# Patient Record
Sex: FEMALE | Race: WHITE | NOT HISPANIC OR LATINO | Employment: OTHER | URBAN - METROPOLITAN AREA
[De-identification: names, ages, dates, MRNs, and addresses within clinical notes are randomized per-mention and may not be internally consistent; named-entity substitution may affect disease eponyms.]

---

## 2022-03-20 ENCOUNTER — LAB REQUISITION (OUTPATIENT)
Dept: LAB | Facility: CLINIC | Age: 67
End: 2022-03-20
Payer: COMMERCIAL

## 2022-03-20 DIAGNOSIS — F10.20 ALCOHOL DEPENDENCE, UNCOMPLICATED (H): ICD-10-CM

## 2022-03-20 DIAGNOSIS — F31.9 BIPOLAR DISORDER, UNSPECIFIED (H): ICD-10-CM

## 2022-03-20 DIAGNOSIS — E87.1 HYPO-OSMOLALITY AND HYPONATREMIA: ICD-10-CM

## 2022-03-22 LAB
ALBUMIN SERPL-MCNC: 3.3 G/DL (ref 3.5–5)
ALP SERPL-CCNC: 52 U/L (ref 45–120)
ALT SERPL W P-5'-P-CCNC: 35 U/L (ref 0–45)
ANION GAP SERPL CALCULATED.3IONS-SCNC: 10 MMOL/L (ref 5–18)
AST SERPL W P-5'-P-CCNC: 27 U/L (ref 0–40)
BILIRUB SERPL-MCNC: 0.5 MG/DL (ref 0–1)
BUN SERPL-MCNC: 12 MG/DL (ref 8–22)
CALCIUM SERPL-MCNC: 9.4 MG/DL (ref 8.5–10.5)
CHLORIDE BLD-SCNC: 93 MMOL/L (ref 98–107)
CO2 SERPL-SCNC: 26 MMOL/L (ref 22–31)
CREAT SERPL-MCNC: 0.71 MG/DL (ref 0.6–1.1)
ERYTHROCYTE [DISTWIDTH] IN BLOOD BY AUTOMATED COUNT: 12.6 % (ref 10–15)
GFR SERPL CREATININE-BSD FRML MDRD: >90 ML/MIN/1.73M2
GLUCOSE BLD-MCNC: 83 MG/DL (ref 70–125)
HCT VFR BLD AUTO: 33.8 % (ref 35–47)
HGB BLD-MCNC: 11.4 G/DL (ref 11.7–15.7)
MCH RBC QN AUTO: 30.5 PG (ref 26.5–33)
MCHC RBC AUTO-ENTMCNC: 33.7 G/DL (ref 31.5–36.5)
MCV RBC AUTO: 90 FL (ref 78–100)
PLATELET # BLD AUTO: 172 10E3/UL (ref 150–450)
POTASSIUM BLD-SCNC: 4.7 MMOL/L (ref 3.5–5)
PROT SERPL-MCNC: 6.2 G/DL (ref 6–8)
RBC # BLD AUTO: 3.74 10E6/UL (ref 3.8–5.2)
SODIUM SERPL-SCNC: 129 MMOL/L (ref 136–145)
TSH SERPL DL<=0.005 MIU/L-ACNC: 1.16 UIU/ML (ref 0.3–5)
VIT B12 SERPL-MCNC: 442 PG/ML (ref 213–816)
WBC # BLD AUTO: 3.1 10E3/UL (ref 4–11)

## 2022-03-22 PROCEDURE — 82306 VITAMIN D 25 HYDROXY: CPT | Mod: ORL | Performed by: NURSE PRACTITIONER

## 2022-03-22 PROCEDURE — 36415 COLL VENOUS BLD VENIPUNCTURE: CPT | Mod: ORL | Performed by: NURSE PRACTITIONER

## 2022-03-22 PROCEDURE — 80053 COMPREHEN METABOLIC PANEL: CPT | Mod: ORL | Performed by: NURSE PRACTITIONER

## 2022-03-22 PROCEDURE — 82607 VITAMIN B-12: CPT | Mod: ORL | Performed by: NURSE PRACTITIONER

## 2022-03-22 PROCEDURE — 85027 COMPLETE CBC AUTOMATED: CPT | Mod: ORL | Performed by: NURSE PRACTITIONER

## 2022-03-22 PROCEDURE — P9603 ONE-WAY ALLOW PRORATED MILES: HCPCS | Mod: ORL | Performed by: NURSE PRACTITIONER

## 2022-03-22 PROCEDURE — 84443 ASSAY THYROID STIM HORMONE: CPT | Mod: ORL | Performed by: NURSE PRACTITIONER

## 2022-03-23 ENCOUNTER — LAB REQUISITION (OUTPATIENT)
Dept: LAB | Facility: CLINIC | Age: 67
End: 2022-03-23

## 2022-03-23 DIAGNOSIS — F10.20 ALCOHOL DEPENDENCE, UNCOMPLICATED (H): ICD-10-CM

## 2022-03-23 LAB — DEPRECATED CALCIDIOL+CALCIFEROL SERPL-MC: 36 UG/L (ref 30–80)

## 2022-06-05 ENCOUNTER — LAB REQUISITION (OUTPATIENT)
Dept: LAB | Facility: CLINIC | Age: 67
End: 2022-06-05
Payer: COMMERCIAL

## 2022-06-05 DIAGNOSIS — E87.1 HYPO-OSMOLALITY AND HYPONATREMIA: ICD-10-CM

## 2022-06-07 LAB
ANION GAP SERPL CALCULATED.3IONS-SCNC: 10 MMOL/L (ref 5–18)
BUN SERPL-MCNC: 10 MG/DL (ref 8–22)
CALCIUM SERPL-MCNC: 9.6 MG/DL (ref 8.5–10.5)
CHLORIDE BLD-SCNC: 91 MMOL/L (ref 98–107)
CO2 SERPL-SCNC: 26 MMOL/L (ref 22–31)
CREAT SERPL-MCNC: 0.63 MG/DL (ref 0.6–1.1)
GFR SERPL CREATININE-BSD FRML MDRD: >90 ML/MIN/1.73M2
GLUCOSE BLD-MCNC: 72 MG/DL (ref 70–125)
POTASSIUM BLD-SCNC: 3.9 MMOL/L (ref 3.5–5)
SODIUM SERPL-SCNC: 127 MMOL/L (ref 136–145)

## 2022-06-07 PROCEDURE — 36415 COLL VENOUS BLD VENIPUNCTURE: CPT | Mod: ORL | Performed by: NURSE PRACTITIONER

## 2022-06-07 PROCEDURE — P9603 ONE-WAY ALLOW PRORATED MILES: HCPCS | Mod: ORL | Performed by: NURSE PRACTITIONER

## 2022-06-07 PROCEDURE — 80048 BASIC METABOLIC PNL TOTAL CA: CPT | Mod: ORL | Performed by: NURSE PRACTITIONER

## 2022-06-08 ENCOUNTER — LAB REQUISITION (OUTPATIENT)
Dept: LAB | Facility: CLINIC | Age: 67
End: 2022-06-08
Payer: COMMERCIAL

## 2022-06-08 DIAGNOSIS — R35.0 FREQUENCY OF MICTURITION: ICD-10-CM

## 2022-06-08 LAB
ALBUMIN UR-MCNC: NEGATIVE MG/DL
APPEARANCE UR: CLEAR
BACTERIA #/AREA URNS HPF: ABNORMAL /HPF
BILIRUB UR QL STRIP: NEGATIVE
COLOR UR AUTO: ABNORMAL
GLUCOSE UR STRIP-MCNC: 100 MG/DL
HGB UR QL STRIP: NEGATIVE
KETONES UR STRIP-MCNC: NEGATIVE MG/DL
LEUKOCYTE ESTERASE UR QL STRIP: ABNORMAL
NITRATE UR QL: NEGATIVE
PH UR STRIP: 6.5 [PH] (ref 5–7)
RBC URINE: 0 /HPF
SP GR UR STRIP: 1.01 (ref 1–1.03)
SQUAMOUS EPITHELIAL: <1 /HPF
UROBILINOGEN UR STRIP-MCNC: <2 MG/DL
WBC URINE: 170 /HPF

## 2022-06-08 PROCEDURE — 87088 URINE BACTERIA CULTURE: CPT | Mod: ORL | Performed by: NURSE PRACTITIONER

## 2022-06-08 PROCEDURE — 81001 URINALYSIS AUTO W/SCOPE: CPT | Mod: ORL | Performed by: NURSE PRACTITIONER

## 2022-06-11 LAB — BACTERIA UR CULT: ABNORMAL

## 2022-06-18 ENCOUNTER — LAB REQUISITION (OUTPATIENT)
Dept: LAB | Facility: CLINIC | Age: 67
End: 2022-06-18
Payer: COMMERCIAL

## 2022-06-18 DIAGNOSIS — E87.1 HYPO-OSMOLALITY AND HYPONATREMIA: ICD-10-CM

## 2022-06-21 LAB — SODIUM SERPL-SCNC: 120 MMOL/L (ref 136–145)

## 2022-06-21 PROCEDURE — P9603 ONE-WAY ALLOW PRORATED MILES: HCPCS | Mod: ORL | Performed by: NURSE PRACTITIONER

## 2022-06-21 PROCEDURE — 84295 ASSAY OF SERUM SODIUM: CPT | Mod: ORL | Performed by: NURSE PRACTITIONER

## 2022-06-21 PROCEDURE — 36415 COLL VENOUS BLD VENIPUNCTURE: CPT | Mod: ORL | Performed by: NURSE PRACTITIONER

## 2022-06-24 ENCOUNTER — LAB REQUISITION (OUTPATIENT)
Dept: LAB | Facility: CLINIC | Age: 67
End: 2022-06-24
Payer: COMMERCIAL

## 2022-06-24 DIAGNOSIS — R30.0 DYSURIA: ICD-10-CM

## 2022-06-24 DIAGNOSIS — R35.0 FREQUENCY OF MICTURITION: ICD-10-CM

## 2022-06-24 LAB
ALBUMIN UR-MCNC: NEGATIVE MG/DL
APPEARANCE UR: CLEAR
BILIRUB UR QL STRIP: NEGATIVE
COLOR UR AUTO: ABNORMAL
GLUCOSE UR STRIP-MCNC: 70 MG/DL
HGB UR QL STRIP: NEGATIVE
KETONES UR STRIP-MCNC: NEGATIVE MG/DL
LEUKOCYTE ESTERASE UR QL STRIP: NEGATIVE
NITRATE UR QL: NEGATIVE
PH UR STRIP: 6 [PH] (ref 5–7)
RBC URINE: <1 /HPF
SP GR UR STRIP: 1.01 (ref 1–1.03)
UROBILINOGEN UR STRIP-MCNC: <2 MG/DL
WBC URINE: <1 /HPF

## 2022-06-24 PROCEDURE — 87086 URINE CULTURE/COLONY COUNT: CPT | Mod: ORL | Performed by: NURSE PRACTITIONER

## 2022-06-24 PROCEDURE — 81001 URINALYSIS AUTO W/SCOPE: CPT | Mod: ORL | Performed by: NURSE PRACTITIONER

## 2022-06-26 ENCOUNTER — LAB REQUISITION (OUTPATIENT)
Dept: LAB | Facility: CLINIC | Age: 67
End: 2022-06-26
Payer: COMMERCIAL

## 2022-06-26 DIAGNOSIS — E87.1 HYPO-OSMOLALITY AND HYPONATREMIA: ICD-10-CM

## 2022-06-26 LAB — BACTERIA UR CULT: NORMAL

## 2022-06-28 LAB — SODIUM SERPL-SCNC: 132 MMOL/L (ref 136–145)

## 2022-06-28 PROCEDURE — P9603 ONE-WAY ALLOW PRORATED MILES: HCPCS | Mod: ORL | Performed by: NURSE PRACTITIONER

## 2022-06-28 PROCEDURE — 84295 ASSAY OF SERUM SODIUM: CPT | Mod: ORL | Performed by: NURSE PRACTITIONER

## 2022-06-28 PROCEDURE — 36415 COLL VENOUS BLD VENIPUNCTURE: CPT | Mod: ORL | Performed by: NURSE PRACTITIONER

## 2022-07-01 ENCOUNTER — LAB REQUISITION (OUTPATIENT)
Dept: LAB | Facility: CLINIC | Age: 67
End: 2022-07-01
Payer: COMMERCIAL

## 2022-07-01 DIAGNOSIS — E87.1 HYPO-OSMOLALITY AND HYPONATREMIA: ICD-10-CM

## 2022-07-05 LAB
HBA1C MFR BLD: 5.5 %
SODIUM SERPL-SCNC: 131 MMOL/L (ref 136–145)

## 2022-07-05 PROCEDURE — 84295 ASSAY OF SERUM SODIUM: CPT | Mod: ORL | Performed by: NURSE PRACTITIONER

## 2022-07-05 PROCEDURE — P9603 ONE-WAY ALLOW PRORATED MILES: HCPCS | Mod: ORL | Performed by: NURSE PRACTITIONER

## 2022-07-05 PROCEDURE — 36415 COLL VENOUS BLD VENIPUNCTURE: CPT | Mod: ORL | Performed by: NURSE PRACTITIONER

## 2022-07-05 PROCEDURE — 83036 HEMOGLOBIN GLYCOSYLATED A1C: CPT | Mod: ORL | Performed by: NURSE PRACTITIONER

## 2022-07-22 ENCOUNTER — LAB REQUISITION (OUTPATIENT)
Dept: LAB | Facility: CLINIC | Age: 67
End: 2022-07-22
Payer: COMMERCIAL

## 2022-07-22 DIAGNOSIS — F31.9 BIPOLAR DISORDER, UNSPECIFIED (H): ICD-10-CM

## 2022-07-22 DIAGNOSIS — E87.1 HYPO-OSMOLALITY AND HYPONATREMIA: ICD-10-CM

## 2022-07-29 ENCOUNTER — LAB REQUISITION (OUTPATIENT)
Dept: LAB | Facility: CLINIC | Age: 67
End: 2022-07-29
Payer: COMMERCIAL

## 2022-07-29 DIAGNOSIS — F31.9 BIPOLAR DISORDER, UNSPECIFIED (H): ICD-10-CM

## 2022-07-29 DIAGNOSIS — E87.1 HYPO-OSMOLALITY AND HYPONATREMIA: ICD-10-CM

## 2022-08-02 LAB
ALBUMIN SERPL BCG-MCNC: 3.6 G/DL (ref 3.5–5.2)
ALP SERPL-CCNC: 52 U/L (ref 35–104)
ALT SERPL W P-5'-P-CCNC: 17 U/L (ref 10–35)
ANION GAP SERPL CALCULATED.3IONS-SCNC: 11 MMOL/L (ref 7–15)
AST SERPL W P-5'-P-CCNC: 23 U/L (ref 10–35)
BILIRUB SERPL-MCNC: 0.3 MG/DL
BUN SERPL-MCNC: 11.9 MG/DL (ref 8–23)
CALCIUM SERPL-MCNC: 9.3 MG/DL (ref 8.8–10.2)
CHLORIDE SERPL-SCNC: 103 MMOL/L (ref 98–107)
CREAT SERPL-MCNC: 0.68 MG/DL (ref 0.51–0.95)
DEPRECATED HCO3 PLAS-SCNC: 25 MMOL/L (ref 22–29)
GFR SERPL CREATININE-BSD FRML MDRD: >90 ML/MIN/1.73M2
GLUCOSE SERPL-MCNC: 70 MG/DL (ref 70–99)
POTASSIUM SERPL-SCNC: 3.9 MMOL/L (ref 3.4–5.3)
PROT SERPL-MCNC: 5.9 G/DL (ref 6.4–8.3)
SODIUM SERPL-SCNC: 139 MMOL/L (ref 136–145)

## 2022-08-02 PROCEDURE — 80053 COMPREHEN METABOLIC PANEL: CPT | Mod: ORL | Performed by: NURSE PRACTITIONER

## 2022-08-02 PROCEDURE — 36415 COLL VENOUS BLD VENIPUNCTURE: CPT | Mod: ORL | Performed by: NURSE PRACTITIONER

## 2022-08-02 PROCEDURE — P9603 ONE-WAY ALLOW PRORATED MILES: HCPCS | Mod: ORL | Performed by: NURSE PRACTITIONER

## 2022-08-05 ENCOUNTER — LAB REQUISITION (OUTPATIENT)
Dept: LAB | Facility: CLINIC | Age: 67
End: 2022-08-05
Payer: COMMERCIAL

## 2022-08-05 DIAGNOSIS — F20.9 SCHIZOPHRENIA, UNSPECIFIED (H): ICD-10-CM

## 2022-08-05 DIAGNOSIS — Z79.899 OTHER LONG TERM (CURRENT) DRUG THERAPY: ICD-10-CM

## 2022-08-09 LAB
AMYLASE SERPL-CCNC: 72 U/L (ref 28–100)
FASTING STATUS PATIENT QL REPORTED: ABNORMAL
GLUCOSE SERPL-MCNC: 66 MG/DL (ref 70–99)
HBA1C MFR BLD: 5.4 %
PROLACTIN SERPL 3RD IS-MCNC: 30 NG/ML (ref 5–23)
T4 FREE SERPL-MCNC: 1.2 NG/DL (ref 0.9–1.7)
TSH SERPL DL<=0.005 MIU/L-ACNC: 1.28 UIU/ML (ref 0.3–4.2)
VALPROATE SERPL-MCNC: 61.9 UG/ML

## 2022-08-09 PROCEDURE — P9603 ONE-WAY ALLOW PRORATED MILES: HCPCS | Mod: ORL | Performed by: PSYCHIATRY & NEUROLOGY

## 2022-08-09 PROCEDURE — 84439 ASSAY OF FREE THYROXINE: CPT | Mod: ORL | Performed by: PSYCHIATRY & NEUROLOGY

## 2022-08-09 PROCEDURE — 80164 ASSAY DIPROPYLACETIC ACD TOT: CPT | Mod: ORL | Performed by: PSYCHIATRY & NEUROLOGY

## 2022-08-09 PROCEDURE — 84146 ASSAY OF PROLACTIN: CPT | Mod: ORL | Performed by: PSYCHIATRY & NEUROLOGY

## 2022-08-09 PROCEDURE — 82150 ASSAY OF AMYLASE: CPT | Mod: ORL | Performed by: PSYCHIATRY & NEUROLOGY

## 2022-08-09 PROCEDURE — 36415 COLL VENOUS BLD VENIPUNCTURE: CPT | Mod: ORL | Performed by: PSYCHIATRY & NEUROLOGY

## 2022-08-09 PROCEDURE — 83036 HEMOGLOBIN GLYCOSYLATED A1C: CPT | Mod: ORL | Performed by: PSYCHIATRY & NEUROLOGY

## 2022-08-09 PROCEDURE — 82947 ASSAY GLUCOSE BLOOD QUANT: CPT | Mod: ORL | Performed by: PSYCHIATRY & NEUROLOGY

## 2022-08-09 PROCEDURE — 84443 ASSAY THYROID STIM HORMONE: CPT | Mod: ORL | Performed by: PSYCHIATRY & NEUROLOGY

## 2022-08-20 ENCOUNTER — LAB REQUISITION (OUTPATIENT)
Dept: LAB | Facility: CLINIC | Age: 67
End: 2022-08-20
Payer: COMMERCIAL

## 2022-08-20 DIAGNOSIS — E87.1 HYPO-OSMOLALITY AND HYPONATREMIA: ICD-10-CM

## 2022-08-23 LAB — SODIUM SERPL-SCNC: 127 MMOL/L (ref 136–145)

## 2022-08-23 PROCEDURE — 36415 COLL VENOUS BLD VENIPUNCTURE: CPT | Mod: ORL | Performed by: NURSE PRACTITIONER

## 2022-08-23 PROCEDURE — P9603 ONE-WAY ALLOW PRORATED MILES: HCPCS | Mod: ORL | Performed by: NURSE PRACTITIONER

## 2022-08-23 PROCEDURE — 84295 ASSAY OF SERUM SODIUM: CPT | Mod: ORL | Performed by: NURSE PRACTITIONER

## 2022-08-29 ENCOUNTER — LAB REQUISITION (OUTPATIENT)
Dept: LAB | Facility: CLINIC | Age: 67
End: 2022-08-29
Payer: COMMERCIAL

## 2022-08-29 LAB
ALBUMIN UR-MCNC: NEGATIVE MG/DL
APPEARANCE UR: CLEAR
BILIRUB UR QL STRIP: NEGATIVE
COLOR UR AUTO: YELLOW
GLUCOSE UR STRIP-MCNC: NEGATIVE MG/DL
HGB UR QL STRIP: NEGATIVE
KETONES UR STRIP-MCNC: NEGATIVE MG/DL
LEUKOCYTE ESTERASE UR QL STRIP: NEGATIVE
NITRATE UR QL: NEGATIVE
PH UR STRIP: 7 [PH] (ref 5–7)
RBC URINE: <1 /HPF
SP GR UR STRIP: 1.01 (ref 1–1.03)
UROBILINOGEN UR STRIP-MCNC: NORMAL MG/DL
WBC URINE: 3 /HPF

## 2022-08-29 PROCEDURE — 81001 URINALYSIS AUTO W/SCOPE: CPT | Mod: ORL

## 2022-08-29 PROCEDURE — 81001 URINALYSIS AUTO W/SCOPE: CPT | Mod: ORL | Performed by: NURSE PRACTITIONER

## 2022-09-09 ENCOUNTER — LAB REQUISITION (OUTPATIENT)
Dept: LAB | Facility: CLINIC | Age: 67
End: 2022-09-09
Payer: COMMERCIAL

## 2022-09-09 DIAGNOSIS — E87.1 HYPO-OSMOLALITY AND HYPONATREMIA: ICD-10-CM

## 2022-09-11 ENCOUNTER — LAB REQUISITION (OUTPATIENT)
Dept: LAB | Facility: CLINIC | Age: 67
End: 2022-09-11
Payer: COMMERCIAL

## 2022-09-11 DIAGNOSIS — E87.1 HYPO-OSMOLALITY AND HYPONATREMIA: ICD-10-CM

## 2022-09-12 LAB
ALBUMIN SERPL BCG-MCNC: 3.9 G/DL (ref 3.5–5.2)
ALP SERPL-CCNC: 57 U/L (ref 35–104)
ALT SERPL W P-5'-P-CCNC: 19 U/L (ref 10–35)
AMYLASE SERPL-CCNC: 98 U/L (ref 28–100)
AST SERPL W P-5'-P-CCNC: 25 U/L (ref 10–35)
BASOPHILS # BLD AUTO: 0 10E3/UL (ref 0–0.2)
BASOPHILS NFR BLD AUTO: 1 %
BILIRUB DIRECT SERPL-MCNC: <0.2 MG/DL (ref 0–0.3)
BILIRUB SERPL-MCNC: 0.4 MG/DL
CHOLEST SERPL-MCNC: 196 MG/DL
EOSINOPHIL # BLD AUTO: 0 10E3/UL (ref 0–0.7)
EOSINOPHIL NFR BLD AUTO: 1 %
ERYTHROCYTE [DISTWIDTH] IN BLOOD BY AUTOMATED COUNT: 13.3 % (ref 10–15)
FASTING STATUS PATIENT QL REPORTED: NORMAL
GLUCOSE SERPL-MCNC: 78 MG/DL (ref 70–99)
HBA1C MFR BLD: 5.4 %
HCT VFR BLD AUTO: 38 % (ref 35–47)
HDLC SERPL-MCNC: 74 MG/DL
HGB BLD-MCNC: 12.6 G/DL (ref 11.7–15.7)
IMM GRANULOCYTES # BLD: 0 10E3/UL
IMM GRANULOCYTES NFR BLD: 0 %
LDLC SERPL CALC-MCNC: 110 MG/DL
LYMPHOCYTES # BLD AUTO: 1.6 10E3/UL (ref 0.8–5.3)
LYMPHOCYTES NFR BLD AUTO: 48 %
MCH RBC QN AUTO: 30.4 PG (ref 26.5–33)
MCHC RBC AUTO-ENTMCNC: 33.2 G/DL (ref 31.5–36.5)
MCV RBC AUTO: 92 FL (ref 78–100)
MONOCYTES # BLD AUTO: 0.4 10E3/UL (ref 0–1.3)
MONOCYTES NFR BLD AUTO: 11 %
NEUTROPHILS # BLD AUTO: 1.3 10E3/UL (ref 1.6–8.3)
NEUTROPHILS NFR BLD AUTO: 39 %
NONHDLC SERPL-MCNC: 122 MG/DL
NRBC # BLD AUTO: 0 10E3/UL
NRBC BLD AUTO-RTO: 0 /100
PLATELET # BLD AUTO: 176 10E3/UL (ref 150–450)
PROT SERPL-MCNC: 6.6 G/DL (ref 6.4–8.3)
RBC # BLD AUTO: 4.15 10E6/UL (ref 3.8–5.2)
SODIUM SERPL-SCNC: 130 MMOL/L (ref 136–145)
TRIGL SERPL-MCNC: 60 MG/DL
VALPROATE SERPL-MCNC: 75.9 UG/ML
WBC # BLD AUTO: 3.4 10E3/UL (ref 4–11)

## 2022-09-12 PROCEDURE — 85025 COMPLETE CBC W/AUTO DIFF WBC: CPT | Mod: ORL | Performed by: PSYCHIATRY & NEUROLOGY

## 2022-09-12 PROCEDURE — 80061 LIPID PANEL: CPT | Mod: ORL | Performed by: PSYCHIATRY & NEUROLOGY

## 2022-09-12 PROCEDURE — 82947 ASSAY GLUCOSE BLOOD QUANT: CPT | Mod: ORL | Performed by: PSYCHIATRY & NEUROLOGY

## 2022-09-12 PROCEDURE — 36415 COLL VENOUS BLD VENIPUNCTURE: CPT | Mod: ORL | Performed by: PSYCHIATRY & NEUROLOGY

## 2022-09-12 PROCEDURE — 80164 ASSAY DIPROPYLACETIC ACD TOT: CPT | Mod: ORL | Performed by: PSYCHIATRY & NEUROLOGY

## 2022-09-12 PROCEDURE — P9603 ONE-WAY ALLOW PRORATED MILES: HCPCS | Mod: ORL | Performed by: PSYCHIATRY & NEUROLOGY

## 2022-09-12 PROCEDURE — 80076 HEPATIC FUNCTION PANEL: CPT | Mod: ORL | Performed by: PSYCHIATRY & NEUROLOGY

## 2022-09-12 PROCEDURE — 84295 ASSAY OF SERUM SODIUM: CPT | Mod: ORL | Performed by: PSYCHIATRY & NEUROLOGY

## 2022-09-12 PROCEDURE — 82150 ASSAY OF AMYLASE: CPT | Mod: ORL | Performed by: PSYCHIATRY & NEUROLOGY

## 2022-09-12 PROCEDURE — 83036 HEMOGLOBIN GLYCOSYLATED A1C: CPT | Mod: ORL | Performed by: PSYCHIATRY & NEUROLOGY

## 2022-09-21 ENCOUNTER — LAB REQUISITION (OUTPATIENT)
Dept: LAB | Facility: CLINIC | Age: 67
End: 2022-09-21
Payer: COMMERCIAL

## 2022-09-21 DIAGNOSIS — E87.1 HYPO-OSMOLALITY AND HYPONATREMIA: ICD-10-CM

## 2022-09-26 LAB — SODIUM SERPL-SCNC: 130 MMOL/L (ref 136–145)

## 2022-09-26 PROCEDURE — P9603 ONE-WAY ALLOW PRORATED MILES: HCPCS | Mod: ORL | Performed by: NURSE PRACTITIONER

## 2022-09-26 PROCEDURE — 84295 ASSAY OF SERUM SODIUM: CPT | Mod: ORL | Performed by: NURSE PRACTITIONER

## 2022-09-26 PROCEDURE — 36415 COLL VENOUS BLD VENIPUNCTURE: CPT | Mod: ORL | Performed by: NURSE PRACTITIONER

## 2022-10-04 ENCOUNTER — LAB REQUISITION (OUTPATIENT)
Dept: LAB | Facility: CLINIC | Age: 67
End: 2022-10-04
Payer: COMMERCIAL

## 2022-10-04 DIAGNOSIS — E87.1 HYPO-OSMOLALITY AND HYPONATREMIA: ICD-10-CM

## 2022-10-12 ENCOUNTER — LAB REQUISITION (OUTPATIENT)
Dept: LAB | Facility: CLINIC | Age: 67
End: 2022-10-12
Payer: COMMERCIAL

## 2022-10-12 DIAGNOSIS — E87.1 HYPO-OSMOLALITY AND HYPONATREMIA: ICD-10-CM

## 2022-10-12 LAB — SODIUM SERPL-SCNC: 129 MMOL/L (ref 136–145)

## 2022-10-12 PROCEDURE — P9604 ONE-WAY ALLOW PRORATED TRIP: HCPCS | Mod: ORL | Performed by: NURSE PRACTITIONER

## 2022-10-12 PROCEDURE — 36415 COLL VENOUS BLD VENIPUNCTURE: CPT | Mod: ORL | Performed by: NURSE PRACTITIONER

## 2022-10-12 PROCEDURE — 84295 ASSAY OF SERUM SODIUM: CPT | Mod: ORL | Performed by: NURSE PRACTITIONER

## 2022-10-19 ENCOUNTER — LAB REQUISITION (OUTPATIENT)
Dept: LAB | Facility: CLINIC | Age: 67
End: 2022-10-19
Payer: COMMERCIAL

## 2022-10-19 DIAGNOSIS — E87.1 HYPO-OSMOLALITY AND HYPONATREMIA: ICD-10-CM

## 2022-10-24 LAB — SODIUM SERPL-SCNC: 132 MMOL/L (ref 136–145)

## 2022-10-24 PROCEDURE — P9603 ONE-WAY ALLOW PRORATED MILES: HCPCS | Mod: ORL | Performed by: NURSE PRACTITIONER

## 2022-10-24 PROCEDURE — 36415 COLL VENOUS BLD VENIPUNCTURE: CPT | Mod: ORL | Performed by: NURSE PRACTITIONER

## 2022-10-24 PROCEDURE — 84295 ASSAY OF SERUM SODIUM: CPT | Mod: ORL | Performed by: NURSE PRACTITIONER

## 2022-11-03 ENCOUNTER — LAB REQUISITION (OUTPATIENT)
Dept: LAB | Facility: CLINIC | Age: 67
End: 2022-11-03
Payer: COMMERCIAL

## 2022-11-03 DIAGNOSIS — E87.1 HYPO-OSMOLALITY AND HYPONATREMIA: ICD-10-CM

## 2022-11-07 LAB — SODIUM SERPL-SCNC: 129 MMOL/L (ref 136–145)

## 2022-11-07 PROCEDURE — P9603 ONE-WAY ALLOW PRORATED MILES: HCPCS | Mod: ORL | Performed by: FAMILY MEDICINE

## 2022-11-07 PROCEDURE — 84295 ASSAY OF SERUM SODIUM: CPT | Mod: ORL | Performed by: FAMILY MEDICINE

## 2022-11-07 PROCEDURE — 36415 COLL VENOUS BLD VENIPUNCTURE: CPT | Mod: ORL | Performed by: FAMILY MEDICINE

## 2022-11-19 ENCOUNTER — LAB REQUISITION (OUTPATIENT)
Dept: LAB | Facility: CLINIC | Age: 67
End: 2022-11-19
Payer: COMMERCIAL

## 2022-11-19 DIAGNOSIS — E87.1 HYPO-OSMOLALITY AND HYPONATREMIA: ICD-10-CM

## 2022-11-21 LAB — SODIUM SERPL-SCNC: 128 MMOL/L (ref 136–145)

## 2022-11-21 PROCEDURE — P9603 ONE-WAY ALLOW PRORATED MILES: HCPCS | Mod: ORL | Performed by: FAMILY MEDICINE

## 2022-11-21 PROCEDURE — 84295 ASSAY OF SERUM SODIUM: CPT | Mod: ORL | Performed by: FAMILY MEDICINE

## 2022-11-21 PROCEDURE — 36415 COLL VENOUS BLD VENIPUNCTURE: CPT | Mod: ORL | Performed by: FAMILY MEDICINE

## 2022-12-16 ENCOUNTER — LAB REQUISITION (OUTPATIENT)
Dept: LAB | Facility: CLINIC | Age: 67
End: 2022-12-16
Payer: COMMERCIAL

## 2022-12-16 DIAGNOSIS — Z03.818 ENCOUNTER FOR OBSERVATION FOR SUSPECTED EXPOSURE TO OTHER BIOLOGICAL AGENTS RULED OUT: ICD-10-CM

## 2022-12-16 PROCEDURE — U0003 INFECTIOUS AGENT DETECTION BY NUCLEIC ACID (DNA OR RNA); SEVERE ACUTE RESPIRATORY SYNDROME CORONAVIRUS 2 (SARS-COV-2) (CORONAVIRUS DISEASE [COVID-19]), AMPLIFIED PROBE TECHNIQUE, MAKING USE OF HIGH THROUGHPUT TECHNOLOGIES AS DESCRIBED BY CMS-2020-01-R: HCPCS | Mod: ORL | Performed by: FAMILY MEDICINE

## 2022-12-17 LAB — SARS-COV-2 RNA RESP QL NAA+PROBE: NEGATIVE

## 2023-01-20 ENCOUNTER — LAB REQUISITION (OUTPATIENT)
Dept: LAB | Facility: CLINIC | Age: 68
End: 2023-01-20
Payer: COMMERCIAL

## 2023-01-20 DIAGNOSIS — E87.1 HYPO-OSMOLALITY AND HYPONATREMIA: ICD-10-CM

## 2023-01-23 LAB
ANION GAP SERPL CALCULATED.3IONS-SCNC: 12 MMOL/L (ref 7–15)
BUN SERPL-MCNC: 11.4 MG/DL (ref 8–23)
CALCIUM SERPL-MCNC: 9.4 MG/DL (ref 8.8–10.2)
CHLORIDE SERPL-SCNC: 94 MMOL/L (ref 98–107)
CREAT SERPL-MCNC: 0.77 MG/DL (ref 0.51–0.95)
DEPRECATED HCO3 PLAS-SCNC: 23 MMOL/L (ref 22–29)
GFR SERPL CREATININE-BSD FRML MDRD: 85 ML/MIN/1.73M2
GLUCOSE SERPL-MCNC: 78 MG/DL (ref 70–99)
POTASSIUM SERPL-SCNC: 4.9 MMOL/L (ref 3.4–5.3)
SODIUM SERPL-SCNC: 129 MMOL/L (ref 136–145)

## 2023-01-23 PROCEDURE — 80048 BASIC METABOLIC PNL TOTAL CA: CPT | Mod: ORL | Performed by: FAMILY MEDICINE

## 2023-01-23 PROCEDURE — P9603 ONE-WAY ALLOW PRORATED MILES: HCPCS | Mod: ORL | Performed by: FAMILY MEDICINE

## 2023-01-23 PROCEDURE — 36415 COLL VENOUS BLD VENIPUNCTURE: CPT | Mod: ORL | Performed by: FAMILY MEDICINE

## 2023-03-13 ENCOUNTER — LAB REQUISITION (OUTPATIENT)
Dept: LAB | Facility: CLINIC | Age: 68
End: 2023-03-13
Payer: COMMERCIAL

## 2023-03-13 DIAGNOSIS — R68.89 OTHER GENERAL SYMPTOMS AND SIGNS: ICD-10-CM

## 2023-03-13 LAB
ALBUMIN UR-MCNC: NEGATIVE MG/DL
APPEARANCE UR: CLEAR
BILIRUB UR QL STRIP: NEGATIVE
COLOR UR AUTO: ABNORMAL
GLUCOSE UR STRIP-MCNC: NEGATIVE MG/DL
HGB UR QL STRIP: NEGATIVE
KETONES UR STRIP-MCNC: NEGATIVE MG/DL
LEUKOCYTE ESTERASE UR QL STRIP: ABNORMAL
NITRATE UR QL: NEGATIVE
PH UR STRIP: 7.5 [PH] (ref 5–7)
RBC URINE: 1 /HPF
SP GR UR STRIP: 1.01 (ref 1–1.03)
TRANSITIONAL EPI: <1 /HPF
UROBILINOGEN UR STRIP-MCNC: NORMAL MG/DL
WBC URINE: 68 /HPF

## 2023-03-13 PROCEDURE — 81001 URINALYSIS AUTO W/SCOPE: CPT | Mod: ORL | Performed by: FAMILY MEDICINE

## 2023-03-13 PROCEDURE — 81001 URINALYSIS AUTO W/SCOPE: CPT | Mod: ORL

## 2023-03-13 PROCEDURE — 87086 URINE CULTURE/COLONY COUNT: CPT | Mod: ORL | Performed by: FAMILY MEDICINE

## 2023-03-13 PROCEDURE — 87086 URINE CULTURE/COLONY COUNT: CPT | Mod: ORL

## 2023-03-14 LAB — BACTERIA UR CULT: ABNORMAL

## 2023-04-19 PROCEDURE — 87088 URINE BACTERIA CULTURE: CPT | Mod: ORL

## 2023-04-19 PROCEDURE — 81001 URINALYSIS AUTO W/SCOPE: CPT | Mod: ORL

## 2023-07-01 ENCOUNTER — LAB REQUISITION (OUTPATIENT)
Dept: LAB | Facility: CLINIC | Age: 68
End: 2023-07-01
Payer: COMMERCIAL

## 2023-07-01 DIAGNOSIS — R53.1 WEAKNESS: ICD-10-CM

## 2023-07-03 LAB
ALBUMIN SERPL BCG-MCNC: 4.1 G/DL (ref 3.5–5.2)
ALP SERPL-CCNC: 51 U/L (ref 35–104)
ALT SERPL W P-5'-P-CCNC: 11 U/L (ref 0–50)
ANION GAP SERPL CALCULATED.3IONS-SCNC: 10 MMOL/L (ref 7–15)
AST SERPL W P-5'-P-CCNC: 21 U/L (ref 0–45)
BILIRUB SERPL-MCNC: 0.3 MG/DL
BUN SERPL-MCNC: 12.4 MG/DL (ref 8–23)
CALCIUM SERPL-MCNC: 9.3 MG/DL (ref 8.8–10.2)
CHLORIDE SERPL-SCNC: 91 MMOL/L (ref 98–107)
CREAT SERPL-MCNC: 0.72 MG/DL (ref 0.51–0.95)
DEPRECATED HCO3 PLAS-SCNC: 25 MMOL/L (ref 22–29)
GFR SERPL CREATININE-BSD FRML MDRD: >90 ML/MIN/1.73M2
GLUCOSE SERPL-MCNC: 85 MG/DL (ref 70–99)
POTASSIUM SERPL-SCNC: 4.4 MMOL/L (ref 3.4–5.3)
PROT SERPL-MCNC: 6.9 G/DL (ref 6.4–8.3)
SODIUM SERPL-SCNC: 126 MMOL/L (ref 136–145)

## 2023-07-03 PROCEDURE — P9603 ONE-WAY ALLOW PRORATED MILES: HCPCS | Mod: ORL | Performed by: FAMILY MEDICINE

## 2023-07-03 PROCEDURE — 36415 COLL VENOUS BLD VENIPUNCTURE: CPT | Mod: ORL | Performed by: FAMILY MEDICINE

## 2023-07-03 PROCEDURE — 80053 COMPREHEN METABOLIC PANEL: CPT | Mod: ORL

## 2023-07-03 PROCEDURE — P9603 ONE-WAY ALLOW PRORATED MILES: HCPCS | Mod: ORL

## 2023-07-03 PROCEDURE — 80053 COMPREHEN METABOLIC PANEL: CPT | Mod: ORL | Performed by: FAMILY MEDICINE

## 2023-07-03 PROCEDURE — 36415 COLL VENOUS BLD VENIPUNCTURE: CPT | Mod: ORL

## 2023-07-07 ENCOUNTER — LAB REQUISITION (OUTPATIENT)
Dept: LAB | Facility: CLINIC | Age: 68
End: 2023-07-07
Payer: COMMERCIAL

## 2023-07-07 DIAGNOSIS — Z79.899 OTHER LONG TERM (CURRENT) DRUG THERAPY: ICD-10-CM

## 2023-07-10 LAB
CHOLEST SERPL-MCNC: 176 MG/DL
FASTING STATUS PATIENT QL REPORTED: ABNORMAL
GLUCOSE SERPL-MCNC: 127 MG/DL (ref 70–99)
HBA1C MFR BLD: 5.5 %
HDLC SERPL-MCNC: 66 MG/DL
LDLC SERPL CALC-MCNC: 91 MG/DL
NONHDLC SERPL-MCNC: 110 MG/DL
PROLACTIN SERPL 3RD IS-MCNC: 40 NG/ML (ref 5–23)
TRIGL SERPL-MCNC: 94 MG/DL

## 2023-07-10 PROCEDURE — 80061 LIPID PANEL: CPT | Mod: ORL | Performed by: PSYCHIATRY & NEUROLOGY

## 2023-07-10 PROCEDURE — 82947 ASSAY GLUCOSE BLOOD QUANT: CPT | Mod: ORL | Performed by: PSYCHIATRY & NEUROLOGY

## 2023-07-10 PROCEDURE — 84146 ASSAY OF PROLACTIN: CPT | Mod: ORL | Performed by: PSYCHIATRY & NEUROLOGY

## 2023-07-10 PROCEDURE — P9604 ONE-WAY ALLOW PRORATED TRIP: HCPCS | Mod: ORL | Performed by: PSYCHIATRY & NEUROLOGY

## 2023-07-10 PROCEDURE — 83036 HEMOGLOBIN GLYCOSYLATED A1C: CPT | Mod: ORL | Performed by: PSYCHIATRY & NEUROLOGY

## 2023-07-10 PROCEDURE — 36415 COLL VENOUS BLD VENIPUNCTURE: CPT | Mod: ORL | Performed by: PSYCHIATRY & NEUROLOGY

## 2024-02-02 ENCOUNTER — LAB REQUISITION (OUTPATIENT)
Dept: LAB | Facility: CLINIC | Age: 69
End: 2024-02-02
Payer: COMMERCIAL

## 2024-02-02 DIAGNOSIS — E87.1 HYPO-OSMOLALITY AND HYPONATREMIA: ICD-10-CM

## 2024-02-05 LAB
ANION GAP SERPL CALCULATED.3IONS-SCNC: 10 MMOL/L (ref 7–15)
BUN SERPL-MCNC: 11.8 MG/DL (ref 8–23)
CALCIUM SERPL-MCNC: 9.2 MG/DL (ref 8.8–10.2)
CHLORIDE SERPL-SCNC: 93 MMOL/L (ref 98–107)
CREAT SERPL-MCNC: 0.89 MG/DL (ref 0.51–0.95)
DEPRECATED HCO3 PLAS-SCNC: 27 MMOL/L (ref 22–29)
EGFRCR SERPLBLD CKD-EPI 2021: 71 ML/MIN/1.73M2
GLUCOSE SERPL-MCNC: 89 MG/DL (ref 70–99)
POTASSIUM SERPL-SCNC: 4.6 MMOL/L (ref 3.4–5.3)
SODIUM SERPL-SCNC: 130 MMOL/L (ref 135–145)

## 2024-02-05 PROCEDURE — 36415 COLL VENOUS BLD VENIPUNCTURE: CPT | Mod: ORL | Performed by: FAMILY MEDICINE

## 2024-02-05 PROCEDURE — 80048 BASIC METABOLIC PNL TOTAL CA: CPT | Mod: ORL | Performed by: FAMILY MEDICINE

## 2024-02-05 PROCEDURE — P9603 ONE-WAY ALLOW PRORATED MILES: HCPCS | Mod: ORL | Performed by: FAMILY MEDICINE

## 2024-04-06 ENCOUNTER — LAB REQUISITION (OUTPATIENT)
Dept: LAB | Facility: CLINIC | Age: 69
End: 2024-04-06
Payer: COMMERCIAL

## 2024-04-06 DIAGNOSIS — Z79.899 OTHER LONG TERM (CURRENT) DRUG THERAPY: ICD-10-CM

## 2024-04-08 LAB — VALPROATE SERPL-MCNC: 78 UG/ML

## 2024-04-08 PROCEDURE — 80164 ASSAY DIPROPYLACETIC ACD TOT: CPT | Mod: ORL | Performed by: PSYCHIATRY & NEUROLOGY

## 2024-04-08 PROCEDURE — P9604 ONE-WAY ALLOW PRORATED TRIP: HCPCS | Mod: ORL | Performed by: PSYCHIATRY & NEUROLOGY

## 2024-04-08 PROCEDURE — 36415 COLL VENOUS BLD VENIPUNCTURE: CPT | Mod: ORL | Performed by: PSYCHIATRY & NEUROLOGY

## 2024-07-31 ENCOUNTER — LAB REQUISITION (OUTPATIENT)
Dept: LAB | Facility: CLINIC | Age: 69
End: 2024-07-31
Payer: COMMERCIAL

## 2024-07-31 DIAGNOSIS — Z79.899 OTHER LONG TERM (CURRENT) DRUG THERAPY: ICD-10-CM

## 2024-08-05 LAB
ALBUMIN SERPL BCG-MCNC: 4.1 G/DL (ref 3.5–5.2)
ALP SERPL-CCNC: 47 U/L (ref 40–150)
ALT SERPL W P-5'-P-CCNC: 15 U/L (ref 0–50)
AMYLASE SERPL-CCNC: 74 U/L (ref 28–100)
AST SERPL W P-5'-P-CCNC: 16 U/L (ref 0–45)
BASOPHILS # BLD AUTO: 0.1 10E3/UL (ref 0–0.2)
BASOPHILS NFR BLD AUTO: 1 %
BILIRUB DIRECT SERPL-MCNC: <0.2 MG/DL (ref 0–0.3)
BILIRUB SERPL-MCNC: 0.3 MG/DL
EOSINOPHIL # BLD AUTO: 0.1 10E3/UL (ref 0–0.7)
EOSINOPHIL NFR BLD AUTO: 1 %
ERYTHROCYTE [DISTWIDTH] IN BLOOD BY AUTOMATED COUNT: 12.9 % (ref 10–15)
HCT VFR BLD AUTO: 37 % (ref 35–47)
HGB BLD-MCNC: 11.8 G/DL (ref 11.7–15.7)
IMM GRANULOCYTES # BLD: 0 10E3/UL
IMM GRANULOCYTES NFR BLD: 0 %
LYMPHOCYTES # BLD AUTO: 2.1 10E3/UL (ref 0.8–5.3)
LYMPHOCYTES NFR BLD AUTO: 48 %
MCH RBC QN AUTO: 28.3 PG (ref 26.5–33)
MCHC RBC AUTO-ENTMCNC: 31.9 G/DL (ref 31.5–36.5)
MCV RBC AUTO: 89 FL (ref 78–100)
MONOCYTES # BLD AUTO: 0.4 10E3/UL (ref 0–1.3)
MONOCYTES NFR BLD AUTO: 9 %
NEUTROPHILS # BLD AUTO: 1.9 10E3/UL (ref 1.6–8.3)
NEUTROPHILS NFR BLD AUTO: 41 %
NRBC # BLD AUTO: 0 10E3/UL
NRBC BLD AUTO-RTO: 0 /100
PLATELET # BLD AUTO: 184 10E3/UL (ref 150–450)
PROT SERPL-MCNC: 6.6 G/DL (ref 6.4–8.3)
RBC # BLD AUTO: 4.17 10E6/UL (ref 3.8–5.2)
T PALLIDUM AB SER QL: NONREACTIVE
VALPROATE SERPL-MCNC: 87.2 UG/ML
WBC # BLD AUTO: 4.5 10E3/UL (ref 4–11)

## 2024-08-05 PROCEDURE — 80076 HEPATIC FUNCTION PANEL: CPT | Mod: ORL | Performed by: PSYCHIATRY & NEUROLOGY

## 2024-08-05 PROCEDURE — 86780 TREPONEMA PALLIDUM: CPT | Mod: ORL | Performed by: PSYCHIATRY & NEUROLOGY

## 2024-08-05 PROCEDURE — 85025 COMPLETE CBC W/AUTO DIFF WBC: CPT | Mod: ORL | Performed by: PSYCHIATRY & NEUROLOGY

## 2024-08-05 PROCEDURE — P9603 ONE-WAY ALLOW PRORATED MILES: HCPCS | Mod: ORL | Performed by: PSYCHIATRY & NEUROLOGY

## 2024-08-05 PROCEDURE — 82150 ASSAY OF AMYLASE: CPT | Mod: ORL | Performed by: PSYCHIATRY & NEUROLOGY

## 2024-08-05 PROCEDURE — 36415 COLL VENOUS BLD VENIPUNCTURE: CPT | Mod: ORL | Performed by: PSYCHIATRY & NEUROLOGY

## 2024-08-05 PROCEDURE — 80164 ASSAY DIPROPYLACETIC ACD TOT: CPT | Mod: ORL | Performed by: PSYCHIATRY & NEUROLOGY

## 2024-08-15 ENCOUNTER — APPOINTMENT (OUTPATIENT)
Dept: CT IMAGING | Facility: CLINIC | Age: 69
End: 2024-08-15
Attending: EMERGENCY MEDICINE
Payer: COMMERCIAL

## 2024-08-15 ENCOUNTER — HOSPITAL ENCOUNTER (EMERGENCY)
Facility: CLINIC | Age: 69
Discharge: HOME | End: 2024-08-15
Attending: EMERGENCY MEDICINE | Admitting: EMERGENCY MEDICINE
Payer: COMMERCIAL

## 2024-08-15 VITALS
SYSTOLIC BLOOD PRESSURE: 181 MMHG | WEIGHT: 178 LBS | RESPIRATION RATE: 20 BRPM | HEART RATE: 76 BPM | OXYGEN SATURATION: 94 % | TEMPERATURE: 98.9 F | BODY MASS INDEX: 33.61 KG/M2 | HEIGHT: 61 IN | DIASTOLIC BLOOD PRESSURE: 81 MMHG

## 2024-08-15 DIAGNOSIS — R41.0 CONFUSION: ICD-10-CM

## 2024-08-15 LAB
ALBUMIN SERPL BCG-MCNC: 4 G/DL (ref 3.5–5.2)
ALBUMIN UR-MCNC: NEGATIVE MG/DL
ALP SERPL-CCNC: 47 U/L (ref 40–150)
ALT SERPL W P-5'-P-CCNC: 14 U/L (ref 0–50)
AMMONIA PLAS-SCNC: 26 UMOL/L (ref 11–51)
AMPHETAMINES UR QL SCN: ABNORMAL
ANION GAP SERPL CALCULATED.3IONS-SCNC: 10 MMOL/L (ref 7–15)
APPEARANCE UR: CLEAR
APTT PPP: 30 SECONDS (ref 22–38)
AST SERPL W P-5'-P-CCNC: 15 U/L (ref 0–45)
ATRIAL RATE - MUSE: 83 BPM
BACTERIA #/AREA URNS HPF: ABNORMAL /HPF
BARBITURATES UR QL SCN: ABNORMAL
BASOPHILS # BLD AUTO: 0 10E3/UL (ref 0–0.2)
BASOPHILS NFR BLD AUTO: 1 %
BENZODIAZ UR QL SCN: ABNORMAL
BILIRUB SERPL-MCNC: 0.2 MG/DL
BILIRUB UR QL STRIP: NEGATIVE
BUN SERPL-MCNC: 11.2 MG/DL (ref 8–23)
BZE UR QL SCN: ABNORMAL
CALCIUM SERPL-MCNC: 8.5 MG/DL (ref 8.8–10.4)
CANNABINOIDS UR QL SCN: ABNORMAL
CHLORIDE SERPL-SCNC: 96 MMOL/L (ref 98–107)
COLOR UR AUTO: ABNORMAL
CREAT SERPL-MCNC: 0.91 MG/DL (ref 0.51–0.95)
DIASTOLIC BLOOD PRESSURE - MUSE: NORMAL MMHG
EGFRCR SERPLBLD CKD-EPI 2021: 69 ML/MIN/1.73M2
EOSINOPHIL # BLD AUTO: 0 10E3/UL (ref 0–0.7)
EOSINOPHIL NFR BLD AUTO: 0 %
ERYTHROCYTE [DISTWIDTH] IN BLOOD BY AUTOMATED COUNT: 13 % (ref 10–15)
ETHANOL SERPL-MCNC: <0.01 G/DL
FENTANYL UR QL: ABNORMAL
GLUCOSE SERPL-MCNC: 106 MG/DL (ref 70–99)
GLUCOSE UR STRIP-MCNC: NEGATIVE MG/DL
HCO3 SERPL-SCNC: 28 MMOL/L (ref 22–29)
HCT VFR BLD AUTO: 34.6 % (ref 35–47)
HGB BLD-MCNC: 11.6 G/DL (ref 11.7–15.7)
HGB UR QL STRIP: NEGATIVE
IMM GRANULOCYTES # BLD: 0 10E3/UL
IMM GRANULOCYTES NFR BLD: 0 %
INR PPP: 0.96 (ref 0.85–1.15)
INTERPRETATION ECG - MUSE: NORMAL
KETONES UR STRIP-MCNC: NEGATIVE MG/DL
LEUKOCYTE ESTERASE UR QL STRIP: ABNORMAL
LYMPHOCYTES # BLD AUTO: 1.4 10E3/UL (ref 0.8–5.3)
LYMPHOCYTES NFR BLD AUTO: 32 %
MAGNESIUM SERPL-MCNC: 1.6 MG/DL (ref 1.7–2.3)
MCH RBC QN AUTO: 28.8 PG (ref 26.5–33)
MCHC RBC AUTO-ENTMCNC: 33.5 G/DL (ref 31.5–36.5)
MCV RBC AUTO: 86 FL (ref 78–100)
MONOCYTES # BLD AUTO: 0.6 10E3/UL (ref 0–1.3)
MONOCYTES NFR BLD AUTO: 13 %
MUCOUS THREADS #/AREA URNS LPF: PRESENT /LPF
NEUTROPHILS # BLD AUTO: 2.4 10E3/UL (ref 1.6–8.3)
NEUTROPHILS NFR BLD AUTO: 54 %
NITRATE UR QL: NEGATIVE
NRBC # BLD AUTO: 0 10E3/UL
NRBC BLD AUTO-RTO: 0 /100
OPIATES UR QL SCN: ABNORMAL
P AXIS - MUSE: 53 DEGREES
PCP QUAL URINE (ROCHE): ABNORMAL
PH UR STRIP: 6.5 [PH] (ref 5–7)
PLATELET # BLD AUTO: 180 10E3/UL (ref 150–450)
POTASSIUM SERPL-SCNC: 4.6 MMOL/L (ref 3.4–5.3)
PR INTERVAL - MUSE: 162 MS
PROT SERPL-MCNC: 6.6 G/DL (ref 6.4–8.3)
QRS DURATION - MUSE: 78 MS
QT - MUSE: 368 MS
QTC - MUSE: 432 MS
R AXIS - MUSE: -4 DEGREES
RBC # BLD AUTO: 4.03 10E6/UL (ref 3.8–5.2)
RBC URINE: <1 /HPF
SODIUM SERPL-SCNC: 134 MMOL/L (ref 135–145)
SP GR UR STRIP: 1.01 (ref 1–1.03)
SQUAMOUS EPITHELIAL: <1 /HPF
SYSTOLIC BLOOD PRESSURE - MUSE: NORMAL MMHG
T AXIS - MUSE: 15 DEGREES
TSH SERPL DL<=0.005 MIU/L-ACNC: 1.74 UIU/ML (ref 0.3–4.2)
UROBILINOGEN UR STRIP-MCNC: <2 MG/DL
VENTRICULAR RATE- MUSE: 83 BPM
WBC # BLD AUTO: 4.4 10E3/UL (ref 4–11)
WBC URINE: 5 /HPF

## 2024-08-15 PROCEDURE — 81001 URINALYSIS AUTO W/SCOPE: CPT | Mod: XU | Performed by: EMERGENCY MEDICINE

## 2024-08-15 PROCEDURE — 93005 ELECTROCARDIOGRAM TRACING: CPT | Performed by: EMERGENCY MEDICINE

## 2024-08-15 PROCEDURE — 84443 ASSAY THYROID STIM HORMONE: CPT | Performed by: EMERGENCY MEDICINE

## 2024-08-15 PROCEDURE — 99285 EMERGENCY DEPT VISIT HI MDM: CPT | Mod: 25

## 2024-08-15 PROCEDURE — 82040 ASSAY OF SERUM ALBUMIN: CPT | Performed by: EMERGENCY MEDICINE

## 2024-08-15 PROCEDURE — 70450 CT HEAD/BRAIN W/O DYE: CPT

## 2024-08-15 PROCEDURE — 85025 COMPLETE CBC W/AUTO DIFF WBC: CPT | Performed by: EMERGENCY MEDICINE

## 2024-08-15 PROCEDURE — 85610 PROTHROMBIN TIME: CPT | Performed by: EMERGENCY MEDICINE

## 2024-08-15 PROCEDURE — 85730 THROMBOPLASTIN TIME PARTIAL: CPT | Performed by: EMERGENCY MEDICINE

## 2024-08-15 PROCEDURE — 82140 ASSAY OF AMMONIA: CPT | Performed by: EMERGENCY MEDICINE

## 2024-08-15 PROCEDURE — 96360 HYDRATION IV INFUSION INIT: CPT

## 2024-08-15 PROCEDURE — 83735 ASSAY OF MAGNESIUM: CPT | Performed by: EMERGENCY MEDICINE

## 2024-08-15 PROCEDURE — 36415 COLL VENOUS BLD VENIPUNCTURE: CPT | Performed by: EMERGENCY MEDICINE

## 2024-08-15 PROCEDURE — 82077 ASSAY SPEC XCP UR&BREATH IA: CPT | Performed by: EMERGENCY MEDICINE

## 2024-08-15 PROCEDURE — 80307 DRUG TEST PRSMV CHEM ANLYZR: CPT | Performed by: EMERGENCY MEDICINE

## 2024-08-15 PROCEDURE — 258N000003 HC RX IP 258 OP 636: Performed by: EMERGENCY MEDICINE

## 2024-08-15 RX ADMIN — SODIUM CHLORIDE 500 ML: 9 INJECTION, SOLUTION INTRAVENOUS at 20:59

## 2024-08-15 ASSESSMENT — ACTIVITIES OF DAILY LIVING (ADL)
ADLS_ACUITY_SCORE: 35

## 2024-08-15 ASSESSMENT — COLUMBIA-SUICIDE SEVERITY RATING SCALE - C-SSRS
2. HAVE YOU ACTUALLY HAD ANY THOUGHTS OF KILLING YOURSELF IN THE PAST MONTH?: NO
6. HAVE YOU EVER DONE ANYTHING, STARTED TO DO ANYTHING, OR PREPARED TO DO ANYTHING TO END YOUR LIFE?: NO
1. IN THE PAST MONTH, HAVE YOU WISHED YOU WERE DEAD OR WISHED YOU COULD GO TO SLEEP AND NOT WAKE UP?: NO

## 2024-08-15 NOTE — ED TRIAGE NOTES
"Arrives to ED accompanied by daughter with c/o increased confusion x2 days. Hx of hyponatremia. \"I need to get my sodium checked\". Reports occasional palpitations. Denies CP at this time. Also has hx of UTIs. Denies urinary sx.      Triage Assessment (Adult)       Row Name 08/15/24 5042          Triage Assessment    Airway WDL WDL        Respiratory WDL    Respiratory WDL WDL        Skin Circulation/Temperature WDL    Skin Circulation/Temperature WDL WDL        Cardiac WDL    Cardiac WDL WDL        Peripheral/Neurovascular WDL    Peripheral Neurovascular WDL WDL        Cognitive/Neuro/Behavioral WDL    Cognitive/Neuro/Behavioral WDL X                     "

## 2024-08-16 ENCOUNTER — LAB REQUISITION (OUTPATIENT)
Dept: LAB | Facility: CLINIC | Age: 69
End: 2024-08-16
Payer: COMMERCIAL

## 2024-08-16 DIAGNOSIS — Z79.899 OTHER LONG TERM (CURRENT) DRUG THERAPY: ICD-10-CM

## 2024-08-16 NOTE — DISCHARGE INSTRUCTIONS
As we discussed, at this point I do not have an obvious cause of her symptoms.  We discussed potentially staying in the hospital and you would prefer to go home and as a result you should follow-up as soon as possible with your primary care doctor and psychiatrist and start weaning the medication as they prescribed.  As we discussed it is also very important that you return back to the ER for any new or worsening concerns.

## 2024-08-19 NOTE — ED PROVIDER NOTES
EMERGENCY DEPARTMENT ENCOUNTER      NAME: Uma Fragoso  AGE: 68 year old female  YOB: 1955  MRN: [id number]  EVALUATION DATE & TIME: 8/15/2024  7:24 PM    PCP: Josué Avendaño    ED PROVIDER: Georgie Costa MD      Chief Complaint   Patient presents with    Altered Mental Status         FINAL IMPRESSION:  1. Confusion          ED COURSE & MEDICAL DECISION MAKING:    Pertinent Labs & Imaging studies reviewed. (See chart for details)    7:37 AM I introduced myself to the patient, obtained patient history, performed a physical exam, and discussed plan for ED workup including potential diagnostic laboratory/imaging studies and interventions.    68 year old female with a pertinent history of depression, dementia, PTSD, hyponatremia, GERD, bipolar 1 disorder, prior alcohol abuse now in remission, COPD who presents to this ED for evaluation of confusion.  Patient is on a litany of  Psychiatric medications including BuSpar, Lexapro, Cogentin, Zyprexa, trazodone, Xanax.  Daughter reports they are trying to wean down her Zyprexa due to her cognitive changes and prior history of some hyponatremia.  Daughter reports she noticed she was slightly confused thinking it was daytime and was nighttime and the other way around for the past 2 days.  Patient states currently she feels basically at her baseline.  Per patient and her daughter.  She has no hyperreflexia, clonus, or increased tone to suggest serotonin syndrome at this time.  Did consider potential infectious etiology including COVID-19.  Also consider electrolyte disturbance, intracranial hemorrhage or mass, alcohol use, hydration, etc.  EKG reveals sinus rhythm with occasional PVCs but no evidence of acute ischemia.  She has no chest pain or shortness of breath to suggest cardiac cause feel this is unlikely.  Laboratory workup initiated.  Did obtain CT of the head to evaluate for signs of intracranial pathology however felt this was less likely with no  focal neurologic deficit. She is ambulating here without difficulty.  CT is unremarkable and has no sign of acute pathology.  Sodium here is actually relatively normal at 134.    This appears to be increased from 6 months ago when it was down to 130.  Glucose 106 thus hypoglycemia not the cause of her presentation.  Otherwise CMP largely unremarkable.  Ammonia within normal limits.  Alcohol level is less than 0.01 thus intoxication does not appear to be her cause.  CBC is at her baseline with a hemoglobin 11.6 and white blood cell count of 4.4.  Normal white blood cell count and no fever making infectious etiology less likely.  Coagulation studies within normal limits.  TSH within normal limits making thyroid dysfunction unlikely.  Urinalysis without sign of UTI.  Urine drug screen positive for amphetamine however this may be related to her medication falsely testing positive for this and she denies any amphetamine use.  Otherwise negative.  Magnesium is 1.6 which is 1 below the lower limit of normal and thus do not feel this requires emergent replacement and can be improved with her eating.  She is initially mildly hypertensive here but otherwise her vital signs here are within normal limits and she is afebrile.  She does not appear significantly confused here.  Was able to tell me that, was running for president and that it was 2024 in August but was slightly off on the date.  She was given 100 mL of IV fluids here in felt improved with this.  Had discussion with the patient and her daughter that at this point I do not see an emergent pathology for her slight confusion.  She has been undergoing evaluation for neurocognitive disorder and is possible that could be slightly worsening.  We did discuss potential admission for further observation and testing versus outpatient evaluation and the patient and her daughter both prefer to do outpatient testing at this time and shared decision making.  Discussed strict return  precautions.  Also discussed that likely her multiple psychiatric medications could be causing her symptoms as well and they are going to continue to follow with her psychiatrist and primary care doctor.  Discussed importance of close follow-up.  Patient and her daughter voiced understanding.  Patient was discharged in stable condition.       At the conclusion of the encounter I discussed the results of all of the tests and the disposition. The questions were answered. The patient or family acknowledged understanding and was agreeable with the care plan.       Medical Decision Making  Obtained supplemental history:Supplemental history obtained?: Documented in chart and Family Member/Significant Other  Reviewed external records: External records reviewed?: Documented in chart  Care impacted by chronic illness:Mental Health  Care significantly affected by social determinants of health:N/A  Did you consider but not order tests?: Work up considered but not performed and documented in chart, if applicable  Did you interpret images independently?: Independent interpretation of ECG and images noted in documentation, when applicable.  Consultation discussion with other provider:Did you involve another provider (consultant, MH, pharmacy, etc.)?: No  Discharge. No recommendations on prescription strength medication(s). I considered admission, but discharged the patient after share decision making conversation.      MEDICATIONS GIVEN IN THE EMERGENCY:  Medications   sodium chloride 0.9% BOLUS 500 mL (0 mLs Intravenous Stopped 8/15/24 2220)       NEW PRESCRIPTIONS STARTED AT TODAY'S ER VISIT  Discharge Medication List as of 8/15/2024 10:20 PM             =================================================================    HPI    Patient information was obtained from: Patient, daughter    Uma Fragoso is a 68 year old female with a pertinent history of depression, dementia, PTSD, hyponatremia, GERD, bipolar 1 disorder, prior  alcohol abuse now in remission, COPD who presents to this ED for evaluation of confusion.  Patient presents with daughter who helps assist with history.  Patient lives in assisted living facility.  Daughter reports that she noticed the patient was more confused over the past 2 days.  Specifically confused on time thinking it was nighttime intent of daytime.  Daughter reports in the past she has had low sodium when this occurred and thus is presenting here with her for evaluation.  Also has history of UTIs.  Patient denies any current urinary symptoms.  Patient has a baseline tremor that is unchanged per daughter.  Daughter does report that she is following with her psychiatrist as she is on multiple psychiatric meds and they are trying to wean them down as reportedly they were felt to hyponatremia.  She just saw her psychiatrist earlier today with plan for tapering down of the Zyprexa.  No recent falls or head trauma.  Patient denies any dizziness or lightheadedness.  She states overall she feels fine at this time.  She denies any chest pain, shortness of breath, nausea, vomiting, diarrhea, fever, cough, abdominal pain, or recent illness.  Denies any alcohol or drug use.  She is ambulating independently.  She is on sodium tabs which she is taking per facility list.  Denies any numbness, tingling, weakness.  No headache or vision changes.      REVIEW OF SYSTEMS   Pertinent positives and negatives are documented in the HPI. All other systems reviewed and are negative.      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    Pneumonia, organism unspecified 10/21/05    Hospitalized       PAST SURGICAL HISTORY:  Past  Surgical History:   Procedure Laterality Date    C APPENDECTOMY,RUPT APPENDX+ABSCESS  10/13/05    HC TREATMENT INCOMPLETE  SURG, ANY TRIMESTER      D & C, following delivery or            CURRENT MEDICATIONS:    albuterol (ALBUTEROL) 108 (90 BASE) MCG/ACT inhaler  Calcium-Vitamin D (CALCIUM 500/D) 500-400 MG-UNIT CHEW  omeprazole 20 MG tablet  PAXIL 20 MG OR TABS  propranolol (INDERAL) 10 MG tablet  SEROQUEL 200 MG OR TABS  SEROQUEL 25 MG OR TABS  triamcinolone (KENALOG) 0.1 % cream  WELLBUTRIN SR## 150 MG OR TB12  XANAX 1 MG OR TABS        ALLERGIES:  Allergies   Allergen Reactions    Amoxicillin Rash       FAMILY HISTORY:  Family History   Problem Relation Age of Onset    Diabetes Mother     Hypertension Mother     Psychotic Disorder Mother         Schizophrenia    Cancer - colorectal Father         later life age 80s    Psychotic Disorder Sister         depression    Gastrointestinal Disease Mother         cirrhosis-etOH related       SOCIAL HISTORY:   Social History     Socioeconomic History    Marital status: Single    Number of children: 4   Occupational History    Occupation: homemaker   Tobacco Use    Smoking status: Former     Types: Cigarettes    Tobacco comments:     patient states quit.   2013   Substance and Sexual Activity    Alcohol use: No    Drug use: No     Comment: 30 years ago     Social Determinants of Health     Financial Resource Strain: Low Risk  (3/13/2024)    [facility]    Financial Resource Strain     Difficulty of Paying Living Expenses: 3   Food Insecurity: No Food Insecurity (3/13/2024)    Received from [facility]    Food Insecurity     Worried About Running Out of Food in the Last Year: 1   Transportation Needs: No Transportation Needs (3/13/2024)    [facility] from [facility] [place]    Transportation Needs     Lack of Transportation (Medical): 1   Social  "Connections: Socially Isolated (3/13/2024)    Received from [facility]    Social Connections     Frequency of Communication with Friends and Family: 4   Housing Stability: Low Risk  (3/13/2024)    Received from [facility]    Housing Stability     Unable to Pay for Housing in the Last Year: 1       VITALS:  BP (!) 181/81   Pulse 76   Temp 98.9  F (37.2  C) (Temporal)   Resp 20   Ht 1.549 m (5' 1\")   Wt 80.7 kg (178 lb)   SpO2 94%   BMI 33.63 kg/m      PHYSICAL EXAM    Physical Exam  Constitutional: Well developed, Well nourished, NAD, GCS 15  HENT: Normocephalic, Atraumatic, Bilateral external ears normal, Oropharynx normal, mucous membranes moist, Nose normal. Neck- Normal range of motion, No tenderness, Supple, No stridor.    Eyes: PERRL, EOMI, Conjunctiva normal, No discharge.   Respiratory: Normal breath sounds, No respiratory distress, No wheezing or crackles, Speaks in full sentences easily.    Cardiovascular: Normal heart rate, Regular rhythm, No murmurs, No rubs, No gallops. 2+ radial pulses bilaterally  GI: Bowel sounds normal, Soft, No tenderness, No masses, No rebound or guarding. No CVA tenderness bilaterally   Musculoskeletal: 2+ DP pulses. No notable lower extremity edema. No cyanosis, No clubbing. Good range of motion in all major joints. No tenderness to palpation or major deformities noted.  Integument: Warm, Dry, No erythema, No rash. No petechiae.    Neurologic: Alert & oriented to self, place, and knows the month and year but state August 13, knows [place] is running for president, 5/5 strength in all 4 extremities bilaterally. Sensation intact to light touch in all 4 extremities and the face bilaterally. No focal deficits noted. Normal gait. CN 2-12 intact bilaterally, no pronator drift, finger to nose intact bilaterally, speech normal, no visual field deficit  Baseline tremor per patient/daughter, no clonus, normal " reflexes, normal tone  Psychiatric: Cooperative.      LAB:  All pertinent labs reviewed and interpreted.  Results for orders placed or performed during the hospital encounter of 08/15/24   Head CT w/o contrast    Impression    IMPRESSION:  1.  No acute intracranial process.   Comprehensive metabolic panel   Result Value Ref Range    Sodium 134 (L) 135 - 145 mmol/L    Potassium 4.6 3.4 - 5.3 mmol/L    Carbon Dioxide (CO2) 28 22 - 29 mmol/L    Anion Gap 10 7 - 15 mmol/L    Urea Nitrogen 11.2 8.0 - 23.0 mg/dL    Creatinine 0.91 0.51 - 0.95 mg/dL    GFR Estimate 69 >60 mL/min/1.73m2    Calcium 8.5 (L) 8.8 - 10.4 mg/dL    Chloride 96 (L) 98 - 107 mmol/L    Glucose 106 (H) 70 - 99 mg/dL    Alkaline Phosphatase 47 40 - 150 U/L    AST 15 0 - 45 U/L    ALT 14 0 - 50 U/L    Protein Total 6.6 6.4 - 8.3 g/dL    Albumin 4.0 3.5 - 5.2 g/dL    Bilirubin Total 0.2 <=1.2 mg/dL   Result Value Ref Range    INR 0.96 0.85 - 1.15   Partial thromboplastin time   Result Value Ref Range    aPTT 30 22 - 38 Seconds   Ethyl Alcohol Level   Result Value Ref Range    Alcohol ethyl <0.01 <=0.01 g/dL   UA with Microscopic reflex to Culture    Specimen: Urine, Clean Catch   Result Value Ref Range    Color Urine Light Yellow Colorless, Straw, Light Yellow, Yellow    Appearance Urine Clear Clear    Glucose Urine Negative Negative mg/dL    Bilirubin Urine Negative Negative    Ketones Urine Negative Negative mg/dL    Specific Gravity Urine 1.015 1.001 - 1.030    Blood Urine Negative Negative    pH Urine 6.5 5.0 - 7.0    Protein Albumin Urine Negative Negative mg/dL    Urobilinogen Urine <2.0 <2.0 mg/dL    Nitrite Urine Negative Negative    Leukocyte Esterase Urine 25 Serene/uL (A) Negative    Bacteria Urine Few (A) None Seen /HPF    Mucus Urine Present (A) None Seen /LPF    RBC Urine <1 <=2 /HPF    WBC Urine 5 <=5 /HPF    Squamous Epithelials Urine <1 <=1 /HPF   Result Value Ref Range    Ammonia 26 11 - 51 umol/L   Result Value Ref Range    Magnesium 1.6  (L) 1.7 - 2.3 mg/dL   CBC with platelets and differential   Result Value Ref Range    WBC Count 4.4 4.0 - 11.0 10e3/uL    RBC Count 4.03 3.80 - 5.20 10e6/uL    Hemoglobin 11.6 (L) 11.7 - 15.7 g/dL    Hematocrit 34.6 (L) 35.0 - 47.0 %    MCV 86 78 - 100 fL    MCH 28.8 26.5 - 33.0 pg    MCHC 33.5 31.5 - 36.5 g/dL    RDW 13.0 10.0 - 15.0 %    Platelet Count 180 150 - 450 10e3/uL    % Neutrophils 54 %    % Lymphocytes 32 %    % Monocytes 13 %    % Eosinophils 0 %    % Basophils 1 %    % Immature Granulocytes 0 %    NRBCs per 100 WBC 0 <1 /100    Absolute Neutrophils 2.4 1.6 - 8.3 10e3/uL    Absolute Lymphocytes 1.4 0.8 - 5.3 10e3/uL    Absolute Monocytes 0.6 0.0 - 1.3 10e3/uL    Absolute Eosinophils 0.0 0.0 - 0.7 10e3/uL    Absolute Basophils 0.0 0.0 - 0.2 10e3/uL    Absolute Immature Granulocytes 0.0 <=0.4 10e3/uL    Absolute NRBCs 0.0 10e3/uL   Urine Drug Screen Panel   Result Value Ref Range    Amphetamines Urine Screen Positive (A) Screen Negative    Barbituates Urine Screen Negative Screen Negative    Benzodiazepine Urine Screen Negative Screen Negative    Cannabinoids Urine Screen Negative Screen Negative    Cocaine Urine Screen Negative Screen Negative    Fentanyl Qual Urine Screen Negative Screen Negative    Opiates Urine Screen Negative Screen Negative    PCP Urine Screen Negative Screen Negative   TSH with free T4 reflex   Result Value Ref Range    TSH 1.74 0.30 - 4.20 uIU/mL   ECG 12-LEAD WITH MUSE (L[facility])   Result Value Ref Range    Systolic Blood Pressure  mmHg    Diastolic Blood Pressure  mmHg    Ventricular Rate 83 BPM    Atrial Rate 83 BPM    OH Interval 162 ms    QRS Duration 78 ms     ms    QTc 432 ms    P Axis 53 degrees    R AXIS -4 degrees    T Axis 15 degrees    Interpretation ECG       Sinus rhythm with occasional Premature ventricular complexes  Nonspecific ST abnormality  Abnormal ECG  No previous ECGs available  Confirmed by SEE ED PROVIDER NOTE FOR, ECG INTERPRETATION ([id number]),   BARBARA PERSON ([id number]) on 8/15/2024 6:48:36 PM         RADIOLOGY:  Reviewed all pertinent imaging. Please see official radiology report.  Head CT w/o contrast   Final Result   IMPRESSION:   1.  No acute intracranial process.          EKG:    Performed at: 6:46 pm    Impression: Sinus rhythm with occasional PVCs.  No evidence of acute ischemia.        I have independently reviewed and interpreted the EKG(s) documented above.    PROCEDURES:   None      [email or web address] [facility] Documentation:   CMS Diagnoses:             Georgie Costa MD  [facility] EMERGENCY ROOM  [facility] [facility] [id number] [phone]       Georgie Costa MD  08/19/24 0369

## 2024-08-27 ENCOUNTER — DOCUMENTATION ONLY (OUTPATIENT)
Dept: OTHER | Facility: CLINIC | Age: 69
End: 2024-08-27
Payer: COMMERCIAL

## 2024-09-04 ENCOUNTER — LAB REQUISITION (OUTPATIENT)
Dept: LAB | Facility: CLINIC | Age: 69
End: 2024-09-04
Payer: COMMERCIAL

## 2024-09-04 DIAGNOSIS — Z13.228 ENCOUNTER FOR SCREENING FOR OTHER METABOLIC DISORDERS: ICD-10-CM

## 2024-09-04 DIAGNOSIS — Z79.899 OTHER LONG TERM (CURRENT) DRUG THERAPY: ICD-10-CM

## 2024-09-16 LAB
ALBUMIN SERPL BCG-MCNC: 3.7 G/DL (ref 3.5–5.2)
ALBUMIN SERPL BCG-MCNC: 3.7 G/DL (ref 3.5–5.2)
ALP SERPL-CCNC: 46 U/L (ref 40–150)
ALT SERPL W P-5'-P-CCNC: 16 U/L (ref 0–50)
ALT SERPL W P-5'-P-CCNC: 16 U/L (ref 0–50)
ANION GAP SERPL CALCULATED.3IONS-SCNC: 15 MMOL/L (ref 7–15)
AST SERPL W P-5'-P-CCNC: 19 U/L (ref 0–45)
AST SERPL W P-5'-P-CCNC: 19 U/L (ref 0–45)
BASOPHILS # BLD AUTO: 0 10E3/UL (ref 0–0.2)
BASOPHILS NFR BLD AUTO: 1 %
BILIRUB SERPL-MCNC: 0.2 MG/DL
BILIRUB SERPL-MCNC: 0.2 MG/DL
BUN SERPL-MCNC: 12.1 MG/DL (ref 8–23)
CALCIUM SERPL-MCNC: 9 MG/DL (ref 8.8–10.4)
CALCIUM SERPL-MCNC: 9 MG/DL (ref 8.8–10.4)
CHLORIDE SERPL-SCNC: 99 MMOL/L (ref 98–107)
CHOLEST SERPL-MCNC: 183 MG/DL
CREAT SERPL-MCNC: 0.78 MG/DL (ref 0.51–0.95)
EGFRCR SERPLBLD CKD-EPI 2021: 82 ML/MIN/1.73M2
EOSINOPHIL # BLD AUTO: 0 10E3/UL (ref 0–0.7)
EOSINOPHIL NFR BLD AUTO: 0 %
ERYTHROCYTE [DISTWIDTH] IN BLOOD BY AUTOMATED COUNT: 13.8 % (ref 10–15)
FASTING STATUS PATIENT QL REPORTED: NO
GGT SERPL-CCNC: 20 U/L (ref 5–36)
GLUCOSE SERPL-MCNC: 128 MG/DL (ref 70–99)
HBA1C MFR BLD: 5.6 %
HCO3 SERPL-SCNC: 22 MMOL/L (ref 22–29)
HCT VFR BLD AUTO: 36 % (ref 35–47)
HDLC SERPL-MCNC: 50 MG/DL
HGB BLD-MCNC: 11.5 G/DL (ref 11.7–15.7)
IMM GRANULOCYTES # BLD: 0 10E3/UL
IMM GRANULOCYTES NFR BLD: 1 %
LDLC SERPL CALC-MCNC: 106 MG/DL
LYMPHOCYTES # BLD AUTO: 1.5 10E3/UL (ref 0.8–5.3)
LYMPHOCYTES NFR BLD AUTO: 40 %
MCH RBC QN AUTO: 29 PG (ref 26.5–33)
MCHC RBC AUTO-ENTMCNC: 31.9 G/DL (ref 31.5–36.5)
MCV RBC AUTO: 91 FL (ref 78–100)
MONOCYTES # BLD AUTO: 0.5 10E3/UL (ref 0–1.3)
MONOCYTES NFR BLD AUTO: 13 %
NEUTROPHILS # BLD AUTO: 1.7 10E3/UL (ref 1.6–8.3)
NEUTROPHILS NFR BLD AUTO: 45 %
NONHDLC SERPL-MCNC: 133 MG/DL
NRBC # BLD AUTO: 0 10E3/UL
NRBC BLD AUTO-RTO: 0 /100
PLATELET # BLD AUTO: 160 10E3/UL (ref 150–450)
POTASSIUM SERPL-SCNC: 4 MMOL/L (ref 3.4–5.3)
POTASSIUM SERPL-SCNC: 4 MMOL/L (ref 3.4–5.3)
PROLACTIN SERPL 3RD IS-MCNC: 21 NG/ML (ref 5–23)
PROT SERPL-MCNC: 6.2 G/DL (ref 6.4–8.3)
PROT SERPL-MCNC: 6.3 G/DL (ref 6.4–8.3)
RBC # BLD AUTO: 3.97 10E6/UL (ref 3.8–5.2)
SODIUM SERPL-SCNC: 134 MMOL/L (ref 135–145)
SODIUM SERPL-SCNC: 136 MMOL/L (ref 135–145)
TRIGL SERPL-MCNC: 136 MG/DL
VALPROATE SERPL-MCNC: 54.9 UG/ML
VIT D+METAB SERPL-MCNC: 15 NG/ML (ref 20–50)
WBC # BLD AUTO: 3.7 10E3/UL (ref 4–11)

## 2024-09-16 PROCEDURE — 82306 VITAMIN D 25 HYDROXY: CPT | Mod: ORL | Performed by: STUDENT IN AN ORGANIZED HEALTH CARE EDUCATION/TRAINING PROGRAM

## 2024-09-16 PROCEDURE — 84146 ASSAY OF PROLACTIN: CPT | Mod: ORL | Performed by: STUDENT IN AN ORGANIZED HEALTH CARE EDUCATION/TRAINING PROGRAM

## 2024-09-16 PROCEDURE — 83036 HEMOGLOBIN GLYCOSYLATED A1C: CPT | Mod: ORL | Performed by: STUDENT IN AN ORGANIZED HEALTH CARE EDUCATION/TRAINING PROGRAM

## 2024-09-16 PROCEDURE — 82977 ASSAY OF GGT: CPT | Mod: ORL | Performed by: STUDENT IN AN ORGANIZED HEALTH CARE EDUCATION/TRAINING PROGRAM

## 2024-09-16 PROCEDURE — P9604 ONE-WAY ALLOW PRORATED TRIP: HCPCS | Mod: ORL | Performed by: STUDENT IN AN ORGANIZED HEALTH CARE EDUCATION/TRAINING PROGRAM

## 2024-09-16 PROCEDURE — 84132 ASSAY OF SERUM POTASSIUM: CPT | Mod: ORL | Performed by: STUDENT IN AN ORGANIZED HEALTH CARE EDUCATION/TRAINING PROGRAM

## 2024-09-16 PROCEDURE — 84155 ASSAY OF PROTEIN SERUM: CPT | Mod: ORL | Performed by: STUDENT IN AN ORGANIZED HEALTH CARE EDUCATION/TRAINING PROGRAM

## 2024-09-16 PROCEDURE — 85025 COMPLETE CBC W/AUTO DIFF WBC: CPT | Mod: ORL | Performed by: STUDENT IN AN ORGANIZED HEALTH CARE EDUCATION/TRAINING PROGRAM

## 2024-09-16 PROCEDURE — 36415 COLL VENOUS BLD VENIPUNCTURE: CPT | Mod: ORL | Performed by: STUDENT IN AN ORGANIZED HEALTH CARE EDUCATION/TRAINING PROGRAM

## 2024-09-16 PROCEDURE — 80053 COMPREHEN METABOLIC PANEL: CPT | Mod: ORL | Performed by: STUDENT IN AN ORGANIZED HEALTH CARE EDUCATION/TRAINING PROGRAM

## 2024-09-16 PROCEDURE — 80164 ASSAY DIPROPYLACETIC ACD TOT: CPT | Mod: ORL | Performed by: STUDENT IN AN ORGANIZED HEALTH CARE EDUCATION/TRAINING PROGRAM

## 2024-09-16 PROCEDURE — 80061 LIPID PANEL: CPT | Mod: ORL | Performed by: STUDENT IN AN ORGANIZED HEALTH CARE EDUCATION/TRAINING PROGRAM

## 2024-11-29 ENCOUNTER — LAB REQUISITION (OUTPATIENT)
Dept: LAB | Facility: CLINIC | Age: 69
End: 2024-11-29
Payer: COMMERCIAL

## 2024-11-29 DIAGNOSIS — Z79.899 OTHER LONG TERM (CURRENT) DRUG THERAPY: ICD-10-CM

## 2024-12-02 LAB
ALBUMIN SERPL BCG-MCNC: 3.9 G/DL (ref 3.5–5.2)
ALP SERPL-CCNC: 57 U/L (ref 40–150)
ALT SERPL W P-5'-P-CCNC: 26 U/L (ref 0–50)
ANION GAP SERPL CALCULATED.3IONS-SCNC: 14 MMOL/L (ref 7–15)
AST SERPL W P-5'-P-CCNC: 25 U/L (ref 0–45)
BASOPHILS # BLD AUTO: 0 10E3/UL (ref 0–0.2)
BASOPHILS NFR BLD AUTO: 1 %
BILIRUB SERPL-MCNC: 0.3 MG/DL
BUN SERPL-MCNC: 8.1 MG/DL (ref 8–23)
CALCIUM SERPL-MCNC: 9.5 MG/DL (ref 8.8–10.4)
CHLORIDE SERPL-SCNC: 91 MMOL/L (ref 98–107)
CREAT SERPL-MCNC: 0.8 MG/DL (ref 0.51–0.95)
EGFRCR SERPLBLD CKD-EPI 2021: 80 ML/MIN/1.73M2
EOSINOPHIL # BLD AUTO: 0 10E3/UL (ref 0–0.7)
EOSINOPHIL NFR BLD AUTO: 0 %
ERYTHROCYTE [DISTWIDTH] IN BLOOD BY AUTOMATED COUNT: 13.3 % (ref 10–15)
GGT SERPL-CCNC: 22 U/L (ref 5–36)
GLUCOSE SERPL-MCNC: 136 MG/DL (ref 70–99)
GLUCOSE SERPL-MCNC: 136 MG/DL (ref 70–99)
HCO3 SERPL-SCNC: 24 MMOL/L (ref 22–29)
HCT VFR BLD AUTO: 39 % (ref 35–47)
HGB BLD-MCNC: 12.7 G/DL (ref 11.7–15.7)
IMM GRANULOCYTES # BLD: 0 10E3/UL
IMM GRANULOCYTES NFR BLD: 0 %
LYMPHOCYTES # BLD AUTO: 1 10E3/UL (ref 0.8–5.3)
LYMPHOCYTES NFR BLD AUTO: 22 %
MCH RBC QN AUTO: 29.7 PG (ref 26.5–33)
MCHC RBC AUTO-ENTMCNC: 32.6 G/DL (ref 31.5–36.5)
MCV RBC AUTO: 91 FL (ref 78–100)
MONOCYTES # BLD AUTO: 0.6 10E3/UL (ref 0–1.3)
MONOCYTES NFR BLD AUTO: 13 %
NEUTROPHILS # BLD AUTO: 2.9 10E3/UL (ref 1.6–8.3)
NEUTROPHILS NFR BLD AUTO: 63 %
NRBC # BLD AUTO: 0 10E3/UL
NRBC BLD AUTO-RTO: 0 /100
PLATELET # BLD AUTO: 207 10E3/UL (ref 150–450)
POTASSIUM SERPL-SCNC: 4.4 MMOL/L (ref 3.4–5.3)
PROT SERPL-MCNC: 6.8 G/DL (ref 6.4–8.3)
RBC # BLD AUTO: 4.28 10E6/UL (ref 3.8–5.2)
SODIUM SERPL-SCNC: 129 MMOL/L (ref 135–145)
VALPROATE SERPL-MCNC: 57.5 UG/ML
WBC # BLD AUTO: 4.6 10E3/UL (ref 4–11)

## 2024-12-02 PROCEDURE — 82977 ASSAY OF GGT: CPT | Mod: ORL | Performed by: STUDENT IN AN ORGANIZED HEALTH CARE EDUCATION/TRAINING PROGRAM

## 2024-12-02 PROCEDURE — 80164 ASSAY DIPROPYLACETIC ACD TOT: CPT | Mod: ORL | Performed by: STUDENT IN AN ORGANIZED HEALTH CARE EDUCATION/TRAINING PROGRAM

## 2024-12-02 PROCEDURE — P9604 ONE-WAY ALLOW PRORATED TRIP: HCPCS | Mod: ORL | Performed by: STUDENT IN AN ORGANIZED HEALTH CARE EDUCATION/TRAINING PROGRAM

## 2024-12-02 PROCEDURE — 36415 COLL VENOUS BLD VENIPUNCTURE: CPT | Mod: ORL | Performed by: STUDENT IN AN ORGANIZED HEALTH CARE EDUCATION/TRAINING PROGRAM

## 2024-12-02 PROCEDURE — 85025 COMPLETE CBC W/AUTO DIFF WBC: CPT | Mod: ORL | Performed by: STUDENT IN AN ORGANIZED HEALTH CARE EDUCATION/TRAINING PROGRAM

## 2024-12-02 PROCEDURE — 80053 COMPREHEN METABOLIC PANEL: CPT | Mod: ORL | Performed by: STUDENT IN AN ORGANIZED HEALTH CARE EDUCATION/TRAINING PROGRAM

## 2024-12-06 ENCOUNTER — HOSPITAL ENCOUNTER (EMERGENCY)
Facility: CLINIC | Age: 69
Discharge: HOME | End: 2024-12-06
Attending: EMERGENCY MEDICINE | Admitting: EMERGENCY MEDICINE
Payer: COMMERCIAL

## 2024-12-06 ENCOUNTER — MEDICAL CORRESPONDENCE (OUTPATIENT)
Dept: HEALTH INFORMATION MANAGEMENT | Facility: CLINIC | Age: 69
End: 2024-12-06

## 2024-12-06 VITALS
HEIGHT: 61 IN | HEART RATE: 83 BPM | WEIGHT: 181 LBS | RESPIRATION RATE: 19 BRPM | SYSTOLIC BLOOD PRESSURE: 139 MMHG | BODY MASS INDEX: 34.17 KG/M2 | DIASTOLIC BLOOD PRESSURE: 74 MMHG | OXYGEN SATURATION: 94 % | TEMPERATURE: 98.1 F

## 2024-12-06 DIAGNOSIS — E87.1 HYPONATREMIA: ICD-10-CM

## 2024-12-06 LAB
ANION GAP SERPL CALCULATED.3IONS-SCNC: 12 MMOL/L (ref 7–15)
ATRIAL RATE - MUSE: 83 BPM
BUN SERPL-MCNC: 7 MG/DL (ref 8–23)
CALCIUM SERPL-MCNC: 9.3 MG/DL (ref 8.8–10.4)
CHLORIDE SERPL-SCNC: 93 MMOL/L (ref 98–107)
CREAT SERPL-MCNC: 0.82 MG/DL (ref 0.51–0.95)
DIASTOLIC BLOOD PRESSURE - MUSE: 72 MMHG
EGFRCR SERPLBLD CKD-EPI 2021: 77 ML/MIN/1.73M2
GLUCOSE SERPL-MCNC: 122 MG/DL (ref 70–99)
HCO3 SERPL-SCNC: 24 MMOL/L (ref 22–29)
HOLD SPECIMEN: NORMAL
INTERPRETATION ECG - MUSE: NORMAL
P AXIS - MUSE: 66 DEGREES
POTASSIUM SERPL-SCNC: 4.2 MMOL/L (ref 3.4–5.3)
PR INTERVAL - MUSE: 152 MS
QRS DURATION - MUSE: 88 MS
QT - MUSE: 386 MS
QTC - MUSE: 453 MS
R AXIS - MUSE: 5 DEGREES
SODIUM SERPL-SCNC: 129 MMOL/L (ref 135–145)
SYSTOLIC BLOOD PRESSURE - MUSE: 164 MMHG
T AXIS - MUSE: 24 DEGREES
VENTRICULAR RATE- MUSE: 83 BPM

## 2024-12-06 PROCEDURE — 258N000003 HC RX IP 258 OP 636: Performed by: EMERGENCY MEDICINE

## 2024-12-06 PROCEDURE — 250N000013 HC RX MED GY IP 250 OP 250 PS 637: Performed by: EMERGENCY MEDICINE

## 2024-12-06 PROCEDURE — 36415 COLL VENOUS BLD VENIPUNCTURE: CPT | Performed by: EMERGENCY MEDICINE

## 2024-12-06 PROCEDURE — 96360 HYDRATION IV INFUSION INIT: CPT

## 2024-12-06 PROCEDURE — 80048 BASIC METABOLIC PNL TOTAL CA: CPT | Performed by: EMERGENCY MEDICINE

## 2024-12-06 PROCEDURE — 99284 EMERGENCY DEPT VISIT MOD MDM: CPT | Mod: 25

## 2024-12-06 PROCEDURE — 82310 ASSAY OF CALCIUM: CPT | Performed by: EMERGENCY MEDICINE

## 2024-12-06 PROCEDURE — 93005 ELECTROCARDIOGRAM TRACING: CPT | Performed by: EMERGENCY MEDICINE

## 2024-12-06 RX ORDER — SODIUM CHLORIDE 1 G/1
2 TABLET ORAL ONCE
Status: COMPLETED | OUTPATIENT
Start: 2024-12-06 | End: 2024-12-06

## 2024-12-06 RX ADMIN — SODIUM CHLORIDE 500 ML: 9 INJECTION, SOLUTION INTRAVENOUS at 12:22

## 2024-12-06 RX ADMIN — SODIUM CHLORIDE TAB 1 GM 2 G: 1 TAB at 12:57

## 2024-12-06 ASSESSMENT — ACTIVITIES OF DAILY LIVING (ADL)
ADLS_ACUITY_SCORE: 41

## 2024-12-06 NOTE — ED PROVIDER NOTES
EMERGENCY DEPARTMENT ENCOUNTER      NAME: Uma Fragoso  AGE: 69 year old female  YOB: 1955  MRN: [id number]  EVALUATION DATE & TIME: 12/6/2024 11:35 AM    PCP: Josué Avendaño    ED PROVIDER: Lanie Badillo DO      Chief Complaint   Patient presents with    Abnormal Labs         FINAL IMPRESSION:  1. Hyponatremia          ED COURSE & MEDICAL DECISION MAKING:    Pertinent Labs & Imaging studies reviewed. (See chart for details)  11:44 AM I met the patient and performed my initial interview and exam.  1:20 PM I spoke with the patient's nursing staff from [facility]. Patient had diarrhea which resolved this morning. Patient is baseline and participated in activities this morning.   1:40 PM I updated the patient on laboratory results. We discussed plan to discharge. Patient is in agreement with the plan.    69 year old female presents to the Emergency Department for evaluation of abnormal labs.  Per report got a call from her clinic after labs are drawn.  Looks like her psychiatry clinic ordered labs.  Sodium was 129.  Corrected sodium for hyperglycemia is 130.  Patient with a history of hyponatremia, I see a history of hyponatremia with decreased serum osm.  She is on sodium chloride 3 times a day.  She reports she has otherwise been in her normal state of health.  No vomiting.  Per facility had been having some diarrhea which is now resolved.  Repeat sodium here is 129, glucose is 122.  Chloride is 93.  Corrected sodium again is 130.  Does not look fluid up, given some gentle fluids.  Given her daily dose of sodium chloride here.  Did check with facility and she participated in activities today and seemed at her baseline.  Given this I do not think she needs any urgent testing or imaging today.  Will have her continue her home medications, should follow-up closely with her primary provider for repeat evaluation.      At the conclusion of the encounter I discussed the results of all  "of the tests and the disposition. The questions were answered. The patient or family acknowledged understanding and was agreeable with the care plan.     Medical Decision Making  Obtained supplemental history:Supplemental history obtained?: Documented in chart  Reviewed external records: External records reviewed?: Documented in chart and Prior Labs: See HPI below.  Care impacted by chronic illness:Documented in Chart  Did you consider but not order tests?: In addition to work-up documented, I considered the following work up:   Did you interpret images independently?: Independent interpretation of ECG and images noted in documentation, when applicable.  Consultation discussion with other provider:Did you involve another provider (consultant, , pharmacy, etc.)?: I discussed the care with another health care provider, see documentation for details.  Discharge. No recommendations on prescription strength medication(s). See documentation for any additional details.    MIPS: Not Applicable      MEDICATIONS GIVEN IN THE EMERGENCY:  Medications   sodium chloride 0.9% BOLUS 500 mL (0 mLs Intravenous Stopped 12/6/24 1313)   sodium chloride tablet 2 g (2 g Oral $Given 12/6/24 1257)       NEW PRESCRIPTIONS STARTED AT TODAY'S ER VISIT  New Prescriptions    No medications on file          =================================================================    HPI    Patient information was obtained from: Patient    Use of : N/A       Uma Fragoso is a 69 year old female with a pertinent history of chronic airway obstruction, GERD, anemia, chronic schizophrenia, and alcohol dependence, who presents to this ED by ambulance for evaluation of low sodium levels. She has a history of this and takes medication for her sodium. Otherwise, has been eating and drinking normally. She's currently \"shaky\", in the ED. Patient usually drinks five 4 oz glasses daily. She denies nausea, vomiting, fevers, or chills. No other medical " concerns are expressed at this time.     Per chart review, patient visited [facility] on 24, for laboratory work. CMP showed sodium of 129, otherwise reassuring. Glucose 136. Valproic acid 57.5. CBC reassuring. GGT 22.    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    Pneumonia, organism unspecified(486) 10/21/05    Hospitalized       PAST SURGICAL HISTORY:  Past Surgical History:   Procedure Laterality Date    HC TREATMENT INCOMPLETE  SURG, ANY TRIMESTER      D & C, following delivery or     ZZC APPENDECTOMY,RUPT APPENDX+ABSCESS  10/13/05           CURRENT MEDICATIONS:    albuterol (ALBUTEROL) 108 (90 BASE) MCG/ACT inhaler  Calcium-Vitamin D (CALCIUM 500/D) 500-400 MG-UNIT CHEW  omeprazole 20 MG tablet  PAXIL 20 MG OR TABS  propranolol (INDERAL) 10 MG tablet  SEROQUEL 200 MG OR TABS  SEROQUEL 25 MG OR TABS  triamcinolone (KENALOG) 0.1 % cream  WELLBUTRIN SR## 150 MG OR TB12  XANAX 1 MG OR TABS         ALLERGIES:  Allergies   Allergen Reactions    Amoxicillin Rash       FAMILY HISTORY:  Family History   Problem Relation Age of Onset    Diabetes Mother     Hypertension Mother     Psychotic Disorder Mother         Schizophrenia    Cancer - colorectal Father         later life age 80s    Psychotic Disorder Sister         depression    Gastrointestinal Disease Mother         cirrhosis-[place] related       SOCIAL HISTORY:   Social History     Socioeconomic History    Marital status: Single    Number of children: 4   Occupational History    Occupation: homemaker   Tobacco Use    Smoking status: Former     Types: Cigarettes     "Tobacco comments:     patient states quit.   8/14/2013   Substance and Sexual Activity    Alcohol use: No    Drug use: No     Comment: 30 years ago     Social Drivers of Health     Financial Resource Strain: Low Risk  (3/13/2024)    Received from [facility] [employer] [facility]    Financial Resource Strain     Difficulty of Paying Living Expenses: 3   Food Insecurity: No Food Insecurity (3/13/2024)    Received from [facility] [employer] [facility]    Food Insecurity     Do you worry your food will run out before you are able to buy more?: 1   Transportation Needs: No Transportation Needs (3/13/2024)    Received from [facility] [employer] [facility]    Transportation Needs     Does lack of transportation keep you from medical appointments?: 1     Does lack of transportation keep you from work, meetings or getting things that you need?: 1   Social Connections: Socially Isolated (3/13/2024)    Received from [facility] [facility] [facility]    Social Connections     Do you often feel lonely or isolated from those around you?: 4   Housing Stability: Low Risk  (3/13/2024)    [facility]    Housing Stability     What is your housing situation today?: 1       VITALS:  BP (!) 193/88 (BP Location: Left arm, Patient Position: Semi-Lancaster's, Cuff Size: Adult Regular)   Pulse 83   Temp 98.1  F (36.7  C) (Oral)   Resp 23   Ht 1.549 m (5' 1\")   Wt 82.1 kg (181 lb)   SpO2 95%   BMI 34.20 kg/m      PHYSICAL EXAM    Physical Exam  Constitutional:       General: She is not in acute distress.  HENT:      Head: Normocephalic and atraumatic.      Mouth/Throat:      Pharynx: Oropharynx is clear.   Eyes:      Pupils: Pupils are equal, round, and reactive to light.   Cardiovascular:      Rate and Rhythm: Normal rate and regular rhythm.      Pulses: Normal pulses.      Heart sounds: Normal heart sounds.   Pulmonary:      Effort: Pulmonary " effort is normal.      Breath sounds: Normal breath sounds.   Abdominal:      General: Abdomen is flat. Bowel sounds are normal.      Palpations: Abdomen is soft.      Tenderness: There is no abdominal tenderness.   Musculoskeletal:         General: Normal range of motion.   Skin:     General: Skin is warm and dry.      Capillary Refill: Capillary refill takes less than 2 seconds.   Neurological:      General: No focal deficit present.      Mental Status: She is alert and oriented to person, place, and time.             LAB:  All pertinent labs reviewed and interpreted.  Labs Ordered and Resulted from Time of ED Arrival to Time of ED Departure   BASIC METABOLIC PANEL - Abnormal       Result Value    Sodium 129 (*)     Potassium 4.2      Chloride 93 (*)     Carbon Dioxide (CO2) 24      Anion Gap 12      Urea Nitrogen 7.0 (*)     Creatinine 0.82      GFR Estimate 77      Calcium 9.3      Glucose 122 (*)        RADIOLOGY:  Reviewed all pertinent imaging. Please see official radiology report.  No orders to display       EKG:    Performed at: 11:40    Impression: Nonspecific ST abnormality.    Rate: 83  Rhythm: Sinus rhythm with premature supraventricular complexes.  Axis: 5  MA Interval: 152  QRS Interval: 88  QTc Interval: 453  ST Changes: None  Comparison: When compared with ECG of 8/15/24, premature ventricular complexes are no longer present & premature supraventricular complexes are now present.    I have independently reviewed and interpreted the EKG(s) documented above.        I, Nelda Olvera, am serving as a scribe to document services personally performed by Dr. Lanie Badillo based on my observation and the provider's statements to me. ILanie DO attest that Nelda Olvera is acting in a scribe capacity, has observed my performance of the services and has documented them in accordance with my direction.    Lanie Badillo DO  Emergency Medicine  [facility] EMERGENCY  ROOM  [address] [place] [id number]  [phone]  Dept: [phone]       Justino, Lanie CHAPMAN DO  12/06/24 1414

## 2024-12-06 NOTE — ED NOTES
Bed: [facility]  Expected date:   Expected time:   Means of arrival: Ambulance  Comments:  [place]

## 2024-12-06 NOTE — DISCHARGE INSTRUCTIONS
Labs do show some ongoing lower sodium but nothing critical or life-threatening  May have been related to decreased oral intake and diarrhea.  You were given small amount of IV fluids today and extra sodium tab  Continue her medications at home  Please follow-up closely with your doctor for repeat lab draw

## 2024-12-06 NOTE — ED TRIAGE NOTES
"  Patient presents to the ED via EMS from [facility]. Had some labs drawn on Monday. Her psychiatrist called today and said she had \"low sodium\" and should come into the hospital. Patient denies any pain and states she would not be here if she wasn't told to come in for a re-evaluation of labs.        "

## 2024-12-06 NOTE — PROGRESS NOTES
IV removed. Lunch tray ordered for patient. Awaiting response from family for transport ride back home.

## 2025-05-07 ENCOUNTER — LAB REQUISITION (OUTPATIENT)
Dept: LAB | Facility: CLINIC | Age: 70
End: 2025-05-07
Payer: COMMERCIAL

## 2025-05-07 DIAGNOSIS — Z13.220 ENCOUNTER FOR SCREENING FOR LIPOID DISORDERS: ICD-10-CM

## 2025-05-07 DIAGNOSIS — Z79.899 OTHER LONG TERM (CURRENT) DRUG THERAPY: ICD-10-CM

## 2025-05-12 LAB
ALBUMIN SERPL BCG-MCNC: 3.8 G/DL (ref 3.5–5.2)
ALP SERPL-CCNC: 56 U/L (ref 40–150)
ALT SERPL W P-5'-P-CCNC: 13 U/L (ref 0–50)
AST SERPL W P-5'-P-CCNC: 18 U/L (ref 0–45)
BASOPHILS # BLD AUTO: 0 10E3/UL (ref 0–0.2)
BASOPHILS NFR BLD AUTO: 1 %
BILIRUB SERPL-MCNC: 0.4 MG/DL
EOSINOPHIL # BLD AUTO: 0 10E3/UL (ref 0–0.7)
EOSINOPHIL NFR BLD AUTO: 0 %
ERYTHROCYTE [DISTWIDTH] IN BLOOD BY AUTOMATED COUNT: 14 % (ref 10–15)
EST. AVERAGE GLUCOSE BLD GHB EST-MCNC: 120 MG/DL
GGT SERPL-CCNC: 38 U/L (ref 5–36)
HBA1C MFR BLD: 5.8 %
HCT VFR BLD AUTO: 35.2 % (ref 35–47)
HGB BLD-MCNC: 11.6 G/DL (ref 11.7–15.7)
IMM GRANULOCYTES # BLD: 0 10E3/UL
IMM GRANULOCYTES NFR BLD: 0 %
LYMPHOCYTES # BLD AUTO: 1.2 10E3/UL (ref 0.8–5.3)
LYMPHOCYTES NFR BLD AUTO: 23 %
MCH RBC QN AUTO: 30 PG (ref 26.5–33)
MCHC RBC AUTO-ENTMCNC: 33 G/DL (ref 31.5–36.5)
MCV RBC AUTO: 91 FL (ref 78–100)
MONOCYTES # BLD AUTO: 0.6 10E3/UL (ref 0–1.3)
MONOCYTES NFR BLD AUTO: 11 %
NEUTROPHILS # BLD AUTO: 3.4 10E3/UL (ref 1.6–8.3)
NEUTROPHILS NFR BLD AUTO: 65 %
NRBC # BLD AUTO: 0 10E3/UL
NRBC BLD AUTO-RTO: 0 /100
PLATELET # BLD AUTO: 194 10E3/UL (ref 150–450)
RBC # BLD AUTO: 3.87 10E6/UL (ref 3.8–5.2)
VALPROATE SERPL-MCNC: 69.1 UG/ML (ref 50–100)
WBC # BLD AUTO: 5.3 10E3/UL (ref 4–11)

## 2025-05-13 LAB
CHOLEST SERPL-MCNC: 176 MG/DL
FASTING STATUS PATIENT QL REPORTED: NO
GLUCOSE SERPL-MCNC: 104 MG/DL (ref 70–99)
HDLC SERPL-MCNC: 63 MG/DL
LDLC SERPL CALC-MCNC: 93 MG/DL
NONHDLC SERPL-MCNC: 113 MG/DL
PROT SERPL-MCNC: 6.6 G/DL (ref 6.4–8.3)
TRIGL SERPL-MCNC: 102 MG/DL

## 2025-08-08 ENCOUNTER — LAB REQUISITION (OUTPATIENT)
Dept: LAB | Facility: CLINIC | Age: 70
End: 2025-08-08
Payer: COMMERCIAL

## 2025-08-08 DIAGNOSIS — Z13.31 ENCOUNTER FOR SCREENING FOR DEPRESSION: ICD-10-CM

## 2025-08-11 LAB
SODIUM SERPL-SCNC: 127 MMOL/L (ref 135–145)
VIT D+METAB SERPL-MCNC: 106 NG/ML (ref 20–50)

## 2025-08-12 ENCOUNTER — HOSPITAL ENCOUNTER (INPATIENT)
Facility: CLINIC | Age: 70
LOS: 2 days | Discharge: HOME | End: 2025-08-14
Attending: EMERGENCY MEDICINE | Admitting: INTERNAL MEDICINE
Payer: COMMERCIAL

## 2025-08-12 ENCOUNTER — OFFICE VISIT (OUTPATIENT)
Dept: URGENT CARE | Facility: URGENT CARE | Age: 70
End: 2025-08-12
Payer: COMMERCIAL

## 2025-08-12 VITALS
DIASTOLIC BLOOD PRESSURE: 93 MMHG | BODY MASS INDEX: 28.7 KG/M2 | HEIGHT: 61 IN | SYSTOLIC BLOOD PRESSURE: 188 MMHG | TEMPERATURE: 98.5 F | HEART RATE: 83 BPM | OXYGEN SATURATION: 97 % | WEIGHT: 152 LBS | RESPIRATION RATE: 16 BRPM

## 2025-08-12 DIAGNOSIS — N30.01 ACUTE CYSTITIS WITH HEMATURIA: Primary | ICD-10-CM

## 2025-08-12 DIAGNOSIS — E87.1 HYPONATREMIA: Primary | ICD-10-CM

## 2025-08-12 DIAGNOSIS — N30.00 ACUTE CYSTITIS WITHOUT HEMATURIA: ICD-10-CM

## 2025-08-12 DIAGNOSIS — E87.1 HYPONATREMIA: ICD-10-CM

## 2025-08-12 LAB
ALBUMIN UR-MCNC: NEGATIVE MG/DL
ALBUMIN UR-MCNC: NEGATIVE MG/DL
ANION GAP SERPL CALCULATED.3IONS-SCNC: 11 MMOL/L (ref 7–15)
APPEARANCE UR: CLEAR
APPEARANCE UR: CLEAR
BACTERIA #/AREA URNS HPF: ABNORMAL /HPF
BACTERIA #/AREA URNS HPF: ABNORMAL /HPF
BILIRUB UR QL STRIP: NEGATIVE
BILIRUB UR QL STRIP: NEGATIVE
BUN SERPL-MCNC: 7.3 MG/DL (ref 8–23)
CALCIUM SERPL-MCNC: 9.7 MG/DL (ref 8.8–10.4)
CHLORIDE SERPL-SCNC: 84 MMOL/L (ref 98–107)
COLOR UR AUTO: COLORLESS
COLOR UR AUTO: YELLOW
CREAT SERPL-MCNC: 0.74 MG/DL (ref 0.51–0.95)
EGFRCR SERPLBLD CKD-EPI 2021: 88 ML/MIN/1.73M2
GLUCOSE SERPL-MCNC: 96 MG/DL (ref 70–99)
GLUCOSE UR STRIP-MCNC: NEGATIVE MG/DL
GLUCOSE UR STRIP-MCNC: NEGATIVE MG/DL
HCO3 SERPL-SCNC: 24 MMOL/L (ref 22–29)
HGB UR QL STRIP: ABNORMAL
HGB UR QL STRIP: NEGATIVE
HOLD SPECIMEN: NORMAL
KETONES UR STRIP-MCNC: NEGATIVE MG/DL
KETONES UR STRIP-MCNC: NEGATIVE MG/DL
LEUKOCYTE ESTERASE UR QL STRIP: ABNORMAL
LEUKOCYTE ESTERASE UR QL STRIP: ABNORMAL
NITRATE UR QL: NEGATIVE
NITRATE UR QL: NEGATIVE
PH UR STRIP: 7 [PH] (ref 5–8)
PH UR STRIP: 7.5 [PH] (ref 5–7)
POTASSIUM SERPL-SCNC: 4.3 MMOL/L (ref 3.4–5.3)
RBC #/AREA URNS AUTO: ABNORMAL /HPF
RBC URINE: 0 /HPF
SODIUM SERPL-SCNC: 119 MMOL/L (ref 135–145)
SODIUM UR-SCNC: 60 MMOL/L
SP GR UR STRIP: 1 (ref 1–1.03)
SP GR UR STRIP: 1.01 (ref 1–1.03)
SQUAMOUS #/AREA URNS AUTO: ABNORMAL /LPF
SQUAMOUS EPITHELIAL: <1 /HPF
TSH SERPL DL<=0.005 MIU/L-ACNC: 1.71 UIU/ML (ref 0.3–4.2)
UROBILINOGEN UR STRIP-ACNC: 0.2 E.U./DL
UROBILINOGEN UR STRIP-MCNC: NORMAL MG/DL
WBC #/AREA URNS AUTO: ABNORMAL /HPF
WBC CLUMPS #/AREA URNS HPF: PRESENT /HPF
WBC URINE: 32 /HPF

## 2025-08-12 PROCEDURE — 120N000001 HC R&B MED SURG/OB

## 2025-08-12 PROCEDURE — 99285 EMERGENCY DEPT VISIT HI MDM: CPT | Mod: 25 | Performed by: EMERGENCY MEDICINE

## 2025-08-12 PROCEDURE — 84300 ASSAY OF URINE SODIUM: CPT | Performed by: INTERNAL MEDICINE

## 2025-08-12 PROCEDURE — 3080F DIAST BP >= 90 MM HG: CPT | Performed by: PHYSICIAN ASSISTANT

## 2025-08-12 PROCEDURE — 36415 COLL VENOUS BLD VENIPUNCTURE: CPT | Performed by: EMERGENCY MEDICINE

## 2025-08-12 PROCEDURE — 87086 URINE CULTURE/COLONY COUNT: CPT | Performed by: PHYSICIAN ASSISTANT

## 2025-08-12 PROCEDURE — 83935 ASSAY OF URINE OSMOLALITY: CPT | Performed by: INTERNAL MEDICINE

## 2025-08-12 PROCEDURE — 99204 OFFICE O/P NEW MOD 45 MIN: CPT | Performed by: PHYSICIAN ASSISTANT

## 2025-08-12 PROCEDURE — 99222 1ST HOSP IP/OBS MODERATE 55: CPT | Performed by: INTERNAL MEDICINE

## 2025-08-12 PROCEDURE — 83930 ASSAY OF BLOOD OSMOLALITY: CPT | Performed by: EMERGENCY MEDICINE

## 2025-08-12 PROCEDURE — 84443 ASSAY THYROID STIM HORMONE: CPT | Performed by: EMERGENCY MEDICINE

## 2025-08-12 PROCEDURE — 3077F SYST BP >= 140 MM HG: CPT | Performed by: PHYSICIAN ASSISTANT

## 2025-08-12 PROCEDURE — 81001 URINALYSIS AUTO W/SCOPE: CPT | Performed by: EMERGENCY MEDICINE

## 2025-08-12 PROCEDURE — 81001 URINALYSIS AUTO W/SCOPE: CPT | Performed by: PHYSICIAN ASSISTANT

## 2025-08-12 PROCEDURE — 80048 BASIC METABOLIC PNL TOTAL CA: CPT | Performed by: EMERGENCY MEDICINE

## 2025-08-12 RX ORDER — LIDOCAINE 40 MG/G
CREAM TOPICAL
Status: DISCONTINUED | OUTPATIENT
Start: 2025-08-12 | End: 2025-08-14 | Stop reason: HOSPADM

## 2025-08-12 RX ORDER — OXYBUTYNIN CHLORIDE 5 MG/1
5 TABLET ORAL 2 TIMES DAILY
COMMUNITY
Start: 2024-03-13

## 2025-08-12 RX ORDER — 3% SODIUM CHLORIDE 3 G/100ML
INJECTION, SOLUTION INTRAVENOUS CONTINUOUS
Status: DISCONTINUED | OUTPATIENT
Start: 2025-08-13 | End: 2025-08-13

## 2025-08-12 RX ORDER — TRAZODONE HYDROCHLORIDE 150 MG/1
150 TABLET ORAL AT BEDTIME
COMMUNITY
Start: 2024-11-18

## 2025-08-12 RX ORDER — PANTOPRAZOLE SODIUM 40 MG/1
40 TABLET, DELAYED RELEASE ORAL DAILY
COMMUNITY
Start: 2024-08-19

## 2025-08-12 RX ORDER — ONDANSETRON 2 MG/ML
4 INJECTION INTRAMUSCULAR; INTRAVENOUS EVERY 6 HOURS PRN
Status: DISCONTINUED | OUTPATIENT
Start: 2025-08-12 | End: 2025-08-14 | Stop reason: HOSPADM

## 2025-08-12 RX ORDER — SULFAMETHOXAZOLE AND TRIMETHOPRIM 800; 160 MG/1; MG/1
1 TABLET ORAL 2 TIMES DAILY
Qty: 6 TABLET | Refills: 0 | Status: SHIPPED | OUTPATIENT
Start: 2025-08-12 | End: 2025-08-13

## 2025-08-12 RX ORDER — DIVALPROEX SODIUM 250 MG/1
TABLET, FILM COATED, EXTENDED RELEASE ORAL
COMMUNITY
Start: 2025-05-03 | End: 2025-08-13

## 2025-08-12 RX ORDER — CEFTRIAXONE 2 G/1
2 INJECTION, POWDER, FOR SOLUTION INTRAMUSCULAR; INTRAVENOUS EVERY 24 HOURS
Status: DISCONTINUED | OUTPATIENT
Start: 2025-08-13 | End: 2025-08-14 | Stop reason: HOSPADM

## 2025-08-12 RX ORDER — OLANZAPINE 10 MG/1
10 TABLET, FILM COATED ORAL AT BEDTIME
COMMUNITY
Start: 2025-01-27

## 2025-08-12 RX ORDER — AMOXICILLIN 250 MG
2 CAPSULE ORAL 2 TIMES DAILY PRN
Status: DISCONTINUED | OUTPATIENT
Start: 2025-08-12 | End: 2025-08-14 | Stop reason: HOSPADM

## 2025-08-12 RX ORDER — ACETAMINOPHEN 325 MG/1
650 TABLET ORAL EVERY 4 HOURS PRN
Status: DISCONTINUED | OUTPATIENT
Start: 2025-08-12 | End: 2025-08-14 | Stop reason: HOSPADM

## 2025-08-12 RX ORDER — ENOXAPARIN SODIUM 100 MG/ML
40 INJECTION SUBCUTANEOUS EVERY 24 HOURS
Status: DISCONTINUED | OUTPATIENT
Start: 2025-08-13 | End: 2025-08-14 | Stop reason: HOSPADM

## 2025-08-12 RX ORDER — OLANZAPINE 2.5 MG/1
2.5 TABLET, FILM COATED ORAL AT BEDTIME
COMMUNITY
Start: 2025-07-21

## 2025-08-12 RX ORDER — BUSPIRONE HYDROCHLORIDE 15 MG/1
15 TABLET ORAL 2 TIMES DAILY
COMMUNITY
Start: 2025-08-06

## 2025-08-12 RX ORDER — ACETAMINOPHEN 650 MG/1
650 SUPPOSITORY RECTAL EVERY 4 HOURS PRN
Status: DISCONTINUED | OUTPATIENT
Start: 2025-08-12 | End: 2025-08-14 | Stop reason: HOSPADM

## 2025-08-12 RX ORDER — DIVALPROEX SODIUM 500 MG/1
1000 TABLET, FILM COATED, EXTENDED RELEASE ORAL AT BEDTIME
COMMUNITY
Start: 2025-05-16

## 2025-08-12 RX ORDER — SODIUM CHLORIDE 1 G/1
2 TABLET ORAL 3 TIMES DAILY
COMMUNITY
Start: 2023-01-11

## 2025-08-12 RX ORDER — HYDROXYZINE PAMOATE 50 MG/1
50 CAPSULE ORAL 2 TIMES DAILY PRN
COMMUNITY
Start: 2024-08-07

## 2025-08-12 RX ORDER — ONDANSETRON 4 MG/1
4 TABLET, ORALLY DISINTEGRATING ORAL EVERY 6 HOURS PRN
Status: DISCONTINUED | OUTPATIENT
Start: 2025-08-12 | End: 2025-08-14 | Stop reason: HOSPADM

## 2025-08-12 RX ORDER — DIPHENHYDRAMINE HCL 25 MG
25 CAPSULE ORAL EVERY 6 HOURS PRN
COMMUNITY
Start: 2024-07-03

## 2025-08-12 RX ORDER — AMOXICILLIN 250 MG
1 CAPSULE ORAL 2 TIMES DAILY PRN
Status: DISCONTINUED | OUTPATIENT
Start: 2025-08-12 | End: 2025-08-14 | Stop reason: HOSPADM

## 2025-08-12 ASSESSMENT — ENCOUNTER SYMPTOMS
TREMORS: 1
ROS GI COMMENTS: INCONTINENCE
DIZZINESS: 1
DYSURIA: 1
HALLUCINATIONS: 0

## 2025-08-12 ASSESSMENT — ACTIVITIES OF DAILY LIVING (ADL)
ADLS_ACUITY_SCORE: 47

## 2025-08-13 PROBLEM — F43.10 PTSD (POST-TRAUMATIC STRESS DISORDER): Status: ACTIVE | Noted: 2022-06-29

## 2025-08-13 PROBLEM — K59.09 CHRONIC CONSTIPATION: Status: ACTIVE | Noted: 2023-05-10

## 2025-08-13 PROBLEM — F10.21 ALCOHOL DEPENDENCE IN REMISSION (H): Status: ACTIVE | Noted: 2022-06-29

## 2025-08-13 PROBLEM — F40.00 AGORAPHOBIA: Status: ACTIVE | Noted: 2022-06-29

## 2025-08-13 PROBLEM — F20.9 CHRONIC SCHIZOPHRENIA (H): Status: ACTIVE | Noted: 2022-06-29

## 2025-08-13 PROBLEM — K21.9 CHRONIC GERD: Status: ACTIVE | Noted: 2022-09-14

## 2025-08-13 PROBLEM — F31.9 BIPOLAR 1 DISORDER (H): Status: ACTIVE | Noted: 2022-06-29

## 2025-08-13 PROBLEM — F02.818 DEMENTIA IN OTHER DISEASES CLASSIFIED ELSEWHERE, UNSPECIFIED SEVERITY, WITH OTHER BEHAVIORAL DISTURBANCE (H): Status: ACTIVE | Noted: 2023-05-10

## 2025-08-13 LAB
ANION GAP SERPL CALCULATED.3IONS-SCNC: 10 MMOL/L (ref 7–15)
BACTERIA UR CULT: NORMAL
BUN SERPL-MCNC: 10.7 MG/DL (ref 8–23)
CALCIUM SERPL-MCNC: 9.5 MG/DL (ref 8.8–10.4)
CHLORIDE SERPL-SCNC: 90 MMOL/L (ref 98–107)
CORTIS SERPL-MCNC: 15.4 UG/DL
CREAT SERPL-MCNC: 0.76 MG/DL (ref 0.51–0.95)
EGFRCR SERPLBLD CKD-EPI 2021: 85 ML/MIN/1.73M2
GLUCOSE SERPL-MCNC: 104 MG/DL (ref 70–99)
HCO3 SERPL-SCNC: 24 MMOL/L (ref 22–29)
HOLD SPECIMEN: NORMAL
HOLD SPECIMEN: NORMAL
OSMOLALITY SERPL: 247 MMOL/KG (ref 280–301)
OSMOLALITY UR: 170 MMOL/KG (ref 100–1200)
POTASSIUM SERPL-SCNC: 4.1 MMOL/L (ref 3.4–5.3)
SODIUM SERPL-SCNC: 121 MMOL/L (ref 135–145)
SODIUM SERPL-SCNC: 124 MMOL/L (ref 135–145)
SODIUM SERPL-SCNC: 127 MMOL/L (ref 135–145)
SODIUM SERPL-SCNC: 131 MMOL/L (ref 135–145)

## 2025-08-13 PROCEDURE — 36415 COLL VENOUS BLD VENIPUNCTURE: CPT | Performed by: PHYSICIAN ASSISTANT

## 2025-08-13 PROCEDURE — 250N000011 HC RX IP 250 OP 636: Performed by: INTERNAL MEDICINE

## 2025-08-13 PROCEDURE — 250N000013 HC RX MED GY IP 250 OP 250 PS 637: Performed by: PHYSICIAN ASSISTANT

## 2025-08-13 PROCEDURE — 36415 COLL VENOUS BLD VENIPUNCTURE: CPT | Performed by: INTERNAL MEDICINE

## 2025-08-13 PROCEDURE — 99233 SBSQ HOSP IP/OBS HIGH 50: CPT | Performed by: STUDENT IN AN ORGANIZED HEALTH CARE EDUCATION/TRAINING PROGRAM

## 2025-08-13 PROCEDURE — 84295 ASSAY OF SERUM SODIUM: CPT | Performed by: INTERNAL MEDICINE

## 2025-08-13 PROCEDURE — 82533 TOTAL CORTISOL: CPT | Performed by: PHYSICIAN ASSISTANT

## 2025-08-13 PROCEDURE — 250N000013 HC RX MED GY IP 250 OP 250 PS 637: Performed by: INTERNAL MEDICINE

## 2025-08-13 PROCEDURE — 84295 ASSAY OF SERUM SODIUM: CPT | Performed by: PHYSICIAN ASSISTANT

## 2025-08-13 PROCEDURE — 250N000011 HC RX IP 250 OP 636: Performed by: STUDENT IN AN ORGANIZED HEALTH CARE EDUCATION/TRAINING PROGRAM

## 2025-08-13 PROCEDURE — 120N000001 HC R&B MED SURG/OB

## 2025-08-13 PROCEDURE — 250N000013 HC RX MED GY IP 250 OP 250 PS 637: Performed by: STUDENT IN AN ORGANIZED HEALTH CARE EDUCATION/TRAINING PROGRAM

## 2025-08-13 PROCEDURE — 99254 IP/OBS CNSLTJ NEW/EST MOD 60: CPT | Performed by: PHYSICIAN ASSISTANT

## 2025-08-13 PROCEDURE — 80048 BASIC METABOLIC PNL TOTAL CA: CPT | Performed by: INTERNAL MEDICINE

## 2025-08-13 RX ORDER — OLANZAPINE 2.5 MG/1
2.5 TABLET, FILM COATED ORAL AT BEDTIME
Status: DISCONTINUED | OUTPATIENT
Start: 2025-08-13 | End: 2025-08-14 | Stop reason: HOSPADM

## 2025-08-13 RX ORDER — PROPRANOLOL HYDROCHLORIDE 10 MG/1
10 TABLET ORAL DAILY
Status: DISCONTINUED | OUTPATIENT
Start: 2025-08-13 | End: 2025-08-13

## 2025-08-13 RX ORDER — FLUTICASONE FUROATE AND VILANTEROL 100; 25 UG/1; UG/1
1 POWDER RESPIRATORY (INHALATION) DAILY
Status: DISCONTINUED | OUTPATIENT
Start: 2025-08-13 | End: 2025-08-14 | Stop reason: HOSPADM

## 2025-08-13 RX ORDER — ALBUTEROL SULFATE 90 UG/1
2 INHALANT RESPIRATORY (INHALATION) EVERY 4 HOURS PRN
Status: DISCONTINUED | OUTPATIENT
Start: 2025-08-13 | End: 2025-08-14 | Stop reason: HOSPADM

## 2025-08-13 RX ORDER — ACETAMINOPHEN 500 MG
1000 TABLET ORAL EVERY 6 HOURS PRN
COMMUNITY

## 2025-08-13 RX ORDER — SODIUM CHLORIDE 1 G/1
2 TABLET ORAL 2 TIMES DAILY WITH MEALS
Status: DISCONTINUED | OUTPATIENT
Start: 2025-08-13 | End: 2025-08-14 | Stop reason: HOSPADM

## 2025-08-13 RX ORDER — SELENIUM SULFIDE 2.5 MG/100ML
LOTION TOPICAL DAILY PRN
COMMUNITY

## 2025-08-13 RX ORDER — DIVALPROEX SODIUM 500 MG/1
1000 TABLET, FILM COATED, EXTENDED RELEASE ORAL AT BEDTIME
Status: DISCONTINUED | OUTPATIENT
Start: 2025-08-13 | End: 2025-08-14 | Stop reason: HOSPADM

## 2025-08-13 RX ORDER — OXYBUTYNIN CHLORIDE 5 MG/1
5 TABLET ORAL 2 TIMES DAILY
Status: DISCONTINUED | OUTPATIENT
Start: 2025-08-13 | End: 2025-08-14 | Stop reason: HOSPADM

## 2025-08-13 RX ORDER — POLYETHYLENE GLYCOL 3350 17 G/17G
17 POWDER, FOR SOLUTION ORAL DAILY
Status: DISCONTINUED | OUTPATIENT
Start: 2025-08-13 | End: 2025-08-14 | Stop reason: HOSPADM

## 2025-08-13 RX ORDER — PANTOPRAZOLE SODIUM 40 MG/1
40 TABLET, DELAYED RELEASE ORAL DAILY
Status: DISCONTINUED | OUTPATIENT
Start: 2025-08-13 | End: 2025-08-14 | Stop reason: HOSPADM

## 2025-08-13 RX ORDER — ALBUTEROL SULFATE 90 UG/1
2 INHALANT RESPIRATORY (INHALATION) EVERY 4 HOURS PRN
Status: DISCONTINUED | OUTPATIENT
Start: 2025-08-13 | End: 2025-08-13

## 2025-08-13 RX ORDER — ALBUTEROL SULFATE 0.83 MG/ML
2.5 SOLUTION RESPIRATORY (INHALATION) EVERY 6 HOURS PRN
Status: DISCONTINUED | OUTPATIENT
Start: 2025-08-13 | End: 2025-08-14 | Stop reason: HOSPADM

## 2025-08-13 RX ORDER — IBUPROFEN 400 MG/1
400 TABLET, FILM COATED ORAL EVERY 6 HOURS PRN
Status: ON HOLD | COMMUNITY
End: 2025-08-14

## 2025-08-13 RX ORDER — TRAZODONE HYDROCHLORIDE 150 MG/1
150 TABLET ORAL AT BEDTIME
Status: DISCONTINUED | OUTPATIENT
Start: 2025-08-13 | End: 2025-08-14 | Stop reason: HOSPADM

## 2025-08-13 RX ORDER — ALBUTEROL SULFATE 0.83 MG/ML
2.5 SOLUTION RESPIRATORY (INHALATION) EVERY 6 HOURS PRN
COMMUNITY

## 2025-08-13 RX ORDER — HYDROXYZINE HYDROCHLORIDE 25 MG/1
50 TABLET, FILM COATED ORAL 2 TIMES DAILY PRN
Status: DISCONTINUED | OUTPATIENT
Start: 2025-08-13 | End: 2025-08-14 | Stop reason: HOSPADM

## 2025-08-13 RX ORDER — DIPHENHYDRAMINE HCL 25 MG
25 CAPSULE ORAL EVERY 6 HOURS PRN
Status: DISCONTINUED | OUTPATIENT
Start: 2025-08-13 | End: 2025-08-14 | Stop reason: HOSPADM

## 2025-08-13 RX ORDER — POLYETHYLENE GLYCOL 3350 17 G/17G
1 POWDER, FOR SOLUTION ORAL DAILY
COMMUNITY

## 2025-08-13 RX ORDER — OLANZAPINE 5 MG/1
10 TABLET, FILM COATED ORAL AT BEDTIME
Status: DISCONTINUED | OUTPATIENT
Start: 2025-08-13 | End: 2025-08-14 | Stop reason: HOSPADM

## 2025-08-13 RX ORDER — HYDRALAZINE HYDROCHLORIDE 20 MG/ML
10 INJECTION INTRAMUSCULAR; INTRAVENOUS EVERY 6 HOURS PRN
Status: DISCONTINUED | OUTPATIENT
Start: 2025-08-13 | End: 2025-08-14 | Stop reason: HOSPADM

## 2025-08-13 RX ADMIN — FLUTICASONE FUROATE AND VILANTEROL TRIFENATATE 1 PUFF: 100; 25 POWDER RESPIRATORY (INHALATION) at 15:32

## 2025-08-13 RX ADMIN — CEFTRIAXONE SODIUM 2 G: 2 INJECTION, POWDER, FOR SOLUTION INTRAMUSCULAR; INTRAVENOUS at 00:54

## 2025-08-13 RX ADMIN — ALBUTEROL SULFATE 2 PUFF: 90 INHALANT RESPIRATORY (INHALATION) at 20:13

## 2025-08-13 RX ADMIN — OLANZAPINE 2.5 MG: 2.5 TABLET, FILM COATED ORAL at 21:08

## 2025-08-13 RX ADMIN — BUSPIRONE HYDROCHLORIDE 15 MG: 10 TABLET ORAL at 21:09

## 2025-08-13 RX ADMIN — OLANZAPINE 10 MG: 5 TABLET, FILM COATED ORAL at 21:07

## 2025-08-13 RX ADMIN — POLYETHYLENE GLYCOL 3350 17 G: 17 POWDER, FOR SOLUTION ORAL at 15:32

## 2025-08-13 RX ADMIN — HYDRALAZINE HYDROCHLORIDE 10 MG: 20 INJECTION INTRAMUSCULAR; INTRAVENOUS at 20:21

## 2025-08-13 RX ADMIN — OXYBUTYNIN CHLORIDE 5 MG: 5 TABLET ORAL at 10:32

## 2025-08-13 RX ADMIN — Medication 2 G: at 13:43

## 2025-08-13 RX ADMIN — CEFTRIAXONE SODIUM 2 G: 2 INJECTION, POWDER, FOR SOLUTION INTRAMUSCULAR; INTRAVENOUS at 23:23

## 2025-08-13 RX ADMIN — OXYBUTYNIN CHLORIDE 5 MG: 5 TABLET ORAL at 21:04

## 2025-08-13 RX ADMIN — ALBUTEROL SULFATE 2 PUFF: 90 INHALANT RESPIRATORY (INHALATION) at 15:52

## 2025-08-13 RX ADMIN — DIVALPROEX SODIUM 1000 MG: 500 TABLET, FILM COATED, EXTENDED RELEASE ORAL at 21:05

## 2025-08-13 RX ADMIN — SODIUM CHLORIDE: 3 INJECTION, SOLUTION INTRAVENOUS at 00:26

## 2025-08-13 RX ADMIN — Medication 2 G: at 17:43

## 2025-08-13 RX ADMIN — TRAZODONE HYDROCHLORIDE 150 MG: 150 TABLET ORAL at 21:09

## 2025-08-13 RX ADMIN — ENOXAPARIN SODIUM 40 MG: 40 INJECTION SUBCUTANEOUS at 06:07

## 2025-08-13 RX ADMIN — UMECLIDINIUM 1 PUFF: 62.5 AEROSOL, POWDER ORAL at 15:29

## 2025-08-13 RX ADMIN — PANTOPRAZOLE SODIUM 40 MG: 40 TABLET, DELAYED RELEASE ORAL at 15:32

## 2025-08-13 ASSESSMENT — ACTIVITIES OF DAILY LIVING (ADL)
ADLS_ACUITY_SCORE: 47
ADLS_ACUITY_SCORE: 48
ADLS_ACUITY_SCORE: 47
ADLS_ACUITY_SCORE: 48
ADLS_ACUITY_SCORE: 47
ADLS_ACUITY_SCORE: 48
ADLS_ACUITY_SCORE: 47
ADLS_ACUITY_SCORE: 56
ADLS_ACUITY_SCORE: 48
ADLS_ACUITY_SCORE: 56
ADLS_ACUITY_SCORE: 48
ADLS_ACUITY_SCORE: 54
ADLS_ACUITY_SCORE: 48
ADLS_ACUITY_SCORE: 56
ADLS_ACUITY_SCORE: 56
ADLS_ACUITY_SCORE: 54
ADLS_ACUITY_SCORE: 56
ADLS_ACUITY_SCORE: 48
ADLS_ACUITY_SCORE: 48

## 2025-08-14 VITALS
BODY MASS INDEX: 27.73 KG/M2 | RESPIRATION RATE: 18 BRPM | DIASTOLIC BLOOD PRESSURE: 72 MMHG | HEART RATE: 87 BPM | SYSTOLIC BLOOD PRESSURE: 154 MMHG | OXYGEN SATURATION: 97 % | TEMPERATURE: 97.3 F | WEIGHT: 146.9 LBS | HEIGHT: 61 IN

## 2025-08-14 LAB
ANION GAP SERPL CALCULATED.3IONS-SCNC: 11 MMOL/L (ref 7–15)
BUN SERPL-MCNC: 13.6 MG/DL (ref 8–23)
CALCIUM SERPL-MCNC: 9.7 MG/DL (ref 8.8–10.4)
CHLORIDE SERPL-SCNC: 98 MMOL/L (ref 98–107)
CREAT SERPL-MCNC: 0.83 MG/DL (ref 0.51–0.95)
EGFRCR SERPLBLD CKD-EPI 2021: 76 ML/MIN/1.73M2
GLUCOSE SERPL-MCNC: 106 MG/DL (ref 70–99)
HCO3 SERPL-SCNC: 24 MMOL/L (ref 22–29)
HOLD SPECIMEN: NORMAL
POTASSIUM SERPL-SCNC: 4.2 MMOL/L (ref 3.4–5.3)
SODIUM SERPL-SCNC: 133 MMOL/L (ref 135–145)

## 2025-08-14 PROCEDURE — 99232 SBSQ HOSP IP/OBS MODERATE 35: CPT | Performed by: PHYSICIAN ASSISTANT

## 2025-08-14 PROCEDURE — 250N000011 HC RX IP 250 OP 636: Performed by: INTERNAL MEDICINE

## 2025-08-14 PROCEDURE — 99239 HOSP IP/OBS DSCHRG MGMT >30: CPT | Performed by: STUDENT IN AN ORGANIZED HEALTH CARE EDUCATION/TRAINING PROGRAM

## 2025-08-14 PROCEDURE — 250N000013 HC RX MED GY IP 250 OP 250 PS 637: Performed by: PHYSICIAN ASSISTANT

## 2025-08-14 PROCEDURE — 36415 COLL VENOUS BLD VENIPUNCTURE: CPT | Performed by: PHYSICIAN ASSISTANT

## 2025-08-14 PROCEDURE — 80048 BASIC METABOLIC PNL TOTAL CA: CPT | Performed by: PHYSICIAN ASSISTANT

## 2025-08-14 PROCEDURE — 250N000013 HC RX MED GY IP 250 OP 250 PS 637: Performed by: STUDENT IN AN ORGANIZED HEALTH CARE EDUCATION/TRAINING PROGRAM

## 2025-08-14 PROCEDURE — 250N000013 HC RX MED GY IP 250 OP 250 PS 637: Performed by: INTERNAL MEDICINE

## 2025-08-14 RX ORDER — CEFPODOXIME PROXETIL 200 MG/1
200 TABLET, FILM COATED ORAL 2 TIMES DAILY
Qty: 6 TABLET | Refills: 0 | Status: SHIPPED | OUTPATIENT
Start: 2025-08-14 | End: 2025-08-17

## 2025-08-14 RX ADMIN — OXYBUTYNIN CHLORIDE 5 MG: 5 TABLET ORAL at 08:01

## 2025-08-14 RX ADMIN — PANTOPRAZOLE SODIUM 40 MG: 40 TABLET, DELAYED RELEASE ORAL at 08:01

## 2025-08-14 RX ADMIN — BUSPIRONE HYDROCHLORIDE 15 MG: 10 TABLET ORAL at 08:00

## 2025-08-14 RX ADMIN — UMECLIDINIUM 1 PUFF: 62.5 AEROSOL, POWDER ORAL at 08:03

## 2025-08-14 RX ADMIN — POLYETHYLENE GLYCOL 3350 17 G: 17 POWDER, FOR SOLUTION ORAL at 08:05

## 2025-08-14 RX ADMIN — ALBUTEROL SULFATE 2 PUFF: 90 INHALANT RESPIRATORY (INHALATION) at 11:10

## 2025-08-14 RX ADMIN — Medication 1 MG: at 01:00

## 2025-08-14 RX ADMIN — FLUTICASONE FUROATE AND VILANTEROL TRIFENATATE 1 PUFF: 100; 25 POWDER RESPIRATORY (INHALATION) at 08:01

## 2025-08-14 RX ADMIN — ENOXAPARIN SODIUM 40 MG: 40 INJECTION SUBCUTANEOUS at 05:30

## 2025-08-14 RX ADMIN — Medication 2 G: at 08:01

## 2025-08-14 ASSESSMENT — ACTIVITIES OF DAILY LIVING (ADL)
ADLS_ACUITY_SCORE: 48
ADLS_ACUITY_SCORE: 53
ADLS_ACUITY_SCORE: 57
ADLS_ACUITY_SCORE: 53
ADLS_ACUITY_SCORE: 57
ADLS_ACUITY_SCORE: 57

## 2025-09-02 ENCOUNTER — DOCUMENTATION ONLY (OUTPATIENT)
Dept: OTHER | Facility: CLINIC | Age: 70
End: 2025-09-02
Payer: COMMERCIAL